# Patient Record
Sex: FEMALE | Race: WHITE | NOT HISPANIC OR LATINO | URBAN - METROPOLITAN AREA
[De-identification: names, ages, dates, MRNs, and addresses within clinical notes are randomized per-mention and may not be internally consistent; named-entity substitution may affect disease eponyms.]

---

## 2017-02-28 ENCOUNTER — INPATIENT (INPATIENT)
Facility: HOSPITAL | Age: 62
LOS: 7 days | Discharge: NURSING, REHAB OR LONG-TERM CARE | End: 2017-03-08
Attending: INTERNAL MEDICINE | Admitting: INTERNAL MEDICINE
Payer: COMMERCIAL

## 2017-02-28 VITALS
WEIGHT: 184.97 LBS | DIASTOLIC BLOOD PRESSURE: 86 MMHG | HEART RATE: 119 BPM | TEMPERATURE: 98 F | OXYGEN SATURATION: 99 % | RESPIRATION RATE: 19 BRPM | HEIGHT: 70 IN | SYSTOLIC BLOOD PRESSURE: 126 MMHG

## 2017-02-28 LAB
ALBUMIN SERPL ELPH-MCNC: 2.3 G/DL — LOW (ref 3.3–5)
ALP SERPL-CCNC: 197 U/L — HIGH (ref 40–120)
ALT FLD-CCNC: 21 U/L — SIGNIFICANT CHANGE UP (ref 12–78)
ANION GAP SERPL CALC-SCNC: 10 MMOL/L — SIGNIFICANT CHANGE UP (ref 5–17)
APTT BLD: 31 SEC — SIGNIFICANT CHANGE UP (ref 27.5–37.4)
AST SERPL-CCNC: 37 U/L — SIGNIFICANT CHANGE UP (ref 15–37)
BASOPHILS # BLD AUTO: 0.1 K/UL — SIGNIFICANT CHANGE UP (ref 0–0.2)
BASOPHILS NFR BLD AUTO: 0.9 % — SIGNIFICANT CHANGE UP (ref 0–2)
BILIRUB SERPL-MCNC: 0.6 MG/DL — SIGNIFICANT CHANGE UP (ref 0.2–1.2)
BUN SERPL-MCNC: 17 MG/DL — SIGNIFICANT CHANGE UP (ref 7–23)
CALCIUM SERPL-MCNC: 7.4 MG/DL — LOW (ref 8.5–10.1)
CHLORIDE SERPL-SCNC: 103 MMOL/L — SIGNIFICANT CHANGE UP (ref 96–108)
CK MB BLD-MCNC: 2.2 % — SIGNIFICANT CHANGE UP (ref 0–3.5)
CK MB CFR SERPL CALC: 0.5 NG/ML — SIGNIFICANT CHANGE UP (ref 0.5–3.6)
CK SERPL-CCNC: 23 U/L — LOW (ref 26–192)
CO2 SERPL-SCNC: 27 MMOL/L — SIGNIFICANT CHANGE UP (ref 22–31)
CREAT SERPL-MCNC: 0.62 MG/DL — SIGNIFICANT CHANGE UP (ref 0.5–1.3)
EOSINOPHIL # BLD AUTO: 0.1 K/UL — SIGNIFICANT CHANGE UP (ref 0–0.5)
EOSINOPHIL NFR BLD AUTO: 0.5 % — SIGNIFICANT CHANGE UP (ref 0–6)
GLUCOSE SERPL-MCNC: 127 MG/DL — HIGH (ref 70–99)
HCT VFR BLD CALC: 40.6 % — SIGNIFICANT CHANGE UP (ref 34.5–45)
HGB BLD-MCNC: 13.4 G/DL — SIGNIFICANT CHANGE UP (ref 11.5–15.5)
INR BLD: 1.32 RATIO — HIGH (ref 0.88–1.16)
LYMPHOCYTES # BLD AUTO: 1.4 K/UL — SIGNIFICANT CHANGE UP (ref 1–3.3)
LYMPHOCYTES # BLD AUTO: 10.3 % — LOW (ref 13–44)
MCHC RBC-ENTMCNC: 31.6 PG — SIGNIFICANT CHANGE UP (ref 27–34)
MCHC RBC-ENTMCNC: 33.1 GM/DL — SIGNIFICANT CHANGE UP (ref 32–36)
MCV RBC AUTO: 95.5 FL — SIGNIFICANT CHANGE UP (ref 80–100)
MONOCYTES # BLD AUTO: 0.7 K/UL — SIGNIFICANT CHANGE UP (ref 0–0.9)
MONOCYTES NFR BLD AUTO: 5.6 % — SIGNIFICANT CHANGE UP (ref 2–14)
NEUTROPHILS # BLD AUTO: 11 K/UL — HIGH (ref 1.8–7.4)
NEUTROPHILS NFR BLD AUTO: 82.8 % — HIGH (ref 43–77)
PLATELET # BLD AUTO: 309 K/UL — SIGNIFICANT CHANGE UP (ref 150–400)
POTASSIUM SERPL-MCNC: 3 MMOL/L — LOW (ref 3.5–5.3)
POTASSIUM SERPL-SCNC: 3 MMOL/L — LOW (ref 3.5–5.3)
PROT SERPL-MCNC: 6.7 GM/DL — SIGNIFICANT CHANGE UP (ref 6–8.3)
PROTHROM AB SERPL-ACNC: 14.8 SEC — HIGH (ref 10–13.1)
RBC # BLD: 4.25 M/UL — SIGNIFICANT CHANGE UP (ref 3.8–5.2)
RBC # FLD: 13.5 % — SIGNIFICANT CHANGE UP (ref 11–15)
SODIUM SERPL-SCNC: 140 MMOL/L — SIGNIFICANT CHANGE UP (ref 135–145)
TROPONIN I SERPL-MCNC: <.015 NG/ML — SIGNIFICANT CHANGE UP (ref 0.01–0.04)
WBC # BLD: 13.2 K/UL — HIGH (ref 3.8–10.5)
WBC # FLD AUTO: 13.2 K/UL — HIGH (ref 3.8–10.5)

## 2017-02-28 PROCEDURE — 99285 EMERGENCY DEPT VISIT HI MDM: CPT

## 2017-02-28 PROCEDURE — 93971 EXTREMITY STUDY: CPT | Mod: 26

## 2017-02-28 PROCEDURE — 71010: CPT | Mod: 26

## 2017-02-28 RX ORDER — GABAPENTIN 400 MG/1
0 CAPSULE ORAL
Qty: 0 | Refills: 0 | COMMUNITY

## 2017-02-28 RX ORDER — HEPARIN SODIUM 5000 [USP'U]/ML
INJECTION INTRAVENOUS; SUBCUTANEOUS
Qty: 25000 | Refills: 0 | Status: DISCONTINUED | OUTPATIENT
Start: 2017-02-28 | End: 2017-03-01

## 2017-02-28 RX ORDER — HEPARIN SODIUM 5000 [USP'U]/ML
6500 INJECTION INTRAVENOUS; SUBCUTANEOUS EVERY 6 HOURS
Qty: 0 | Refills: 0 | Status: DISCONTINUED | OUTPATIENT
Start: 2017-02-28 | End: 2017-03-03

## 2017-02-28 RX ORDER — ONDANSETRON 8 MG/1
4 TABLET, FILM COATED ORAL ONCE
Qty: 0 | Refills: 0 | Status: COMPLETED | OUTPATIENT
Start: 2017-02-28 | End: 2017-02-28

## 2017-02-28 RX ORDER — PANTOPRAZOLE SODIUM 20 MG/1
40 TABLET, DELAYED RELEASE ORAL ONCE
Qty: 0 | Refills: 0 | Status: COMPLETED | OUTPATIENT
Start: 2017-02-28 | End: 2017-02-28

## 2017-02-28 RX ORDER — SODIUM CHLORIDE 9 MG/ML
3 INJECTION INTRAMUSCULAR; INTRAVENOUS; SUBCUTANEOUS ONCE
Qty: 0 | Refills: 0 | Status: COMPLETED | OUTPATIENT
Start: 2017-02-28 | End: 2017-02-28

## 2017-02-28 RX ORDER — AZITHROMYCIN 500 MG/1
500 TABLET, FILM COATED ORAL ONCE
Qty: 0 | Refills: 0 | Status: COMPLETED | OUTPATIENT
Start: 2017-02-28 | End: 2017-03-01

## 2017-02-28 RX ORDER — CEFTRIAXONE 500 MG/1
1 INJECTION, POWDER, FOR SOLUTION INTRAMUSCULAR; INTRAVENOUS ONCE
Qty: 0 | Refills: 0 | Status: COMPLETED | OUTPATIENT
Start: 2017-02-28 | End: 2017-03-01

## 2017-02-28 RX ORDER — HEPARIN SODIUM 5000 [USP'U]/ML
3000 INJECTION INTRAVENOUS; SUBCUTANEOUS EVERY 6 HOURS
Qty: 0 | Refills: 0 | Status: DISCONTINUED | OUTPATIENT
Start: 2017-02-28 | End: 2017-03-03

## 2017-02-28 RX ORDER — HEPARIN SODIUM 5000 [USP'U]/ML
6500 INJECTION INTRAVENOUS; SUBCUTANEOUS ONCE
Qty: 0 | Refills: 0 | Status: COMPLETED | OUTPATIENT
Start: 2017-02-28 | End: 2017-03-01

## 2017-02-28 RX ADMIN — ONDANSETRON 4 MILLIGRAM(S): 8 TABLET, FILM COATED ORAL at 22:59

## 2017-02-28 RX ADMIN — SODIUM CHLORIDE 3 MILLILITER(S): 9 INJECTION INTRAMUSCULAR; INTRAVENOUS; SUBCUTANEOUS at 22:28

## 2017-02-28 RX ADMIN — PANTOPRAZOLE SODIUM 40 MILLIGRAM(S): 20 TABLET, DELAYED RELEASE ORAL at 22:59

## 2017-02-28 NOTE — ED ADULT TRIAGE NOTE - CHIEF COMPLAINT QUOTE
Patient BIBA for swollen Left leg. Patient states : "leg swollen really bad over the weekend." Denies injury or trauma. Denies pain "I have neuropathy so my legs are always numb."

## 2017-02-28 NOTE — ED ADULT NURSE NOTE - OBJECTIVE STATEMENT
Pt states her left leg began to swell starting about 1 week ago.  Has had swelling in both feet for some time.  Has hx of severe neuropathy in both hands and feet and is unable to ambulate.  Has lesion on spine.

## 2017-02-28 NOTE — ED PROVIDER NOTE - OBJECTIVE STATEMENT
61yoF; with pmh signif for Gastric Cancer (s/p partial gastrectomy), HTN, Spinal Lesion causing LE weakness (non-ambulatory); now p/w left leg swelling and pain x1 week. c/o cough x1 week. denies f/c/s. denies chest pain. denies sob/palp. denies abd pain. denies n/v/d. patient is former smoker, not taking any hormonal therapy, denies recent surgery or trauma.

## 2017-02-28 NOTE — ED PROVIDER NOTE - PHYSICAL EXAMINATION
General:     NAD, well-nourished, well-appearing  Head:     NC/AT, EOMI, oral mucosa moist  Neck:     trachea midline  Lungs:     CTA b/l, no w/r/r  CVS:     S1S2, RRR, no m/g/r  Abd:     +BS, s/nt/nd, no organomegaly  Ext:   intact bilateral radial and pedal pulses, 3+ left LE edema to thigh

## 2017-03-01 DIAGNOSIS — E27.9 DISORDER OF ADRENAL GLAND, UNSPECIFIED: ICD-10-CM

## 2017-03-01 DIAGNOSIS — I82.402 ACUTE EMBOLISM AND THROMBOSIS OF UNSPECIFIED DEEP VEINS OF LEFT LOWER EXTREMITY: ICD-10-CM

## 2017-03-01 DIAGNOSIS — C16.9 MALIGNANT NEOPLASM OF STOMACH, UNSPECIFIED: ICD-10-CM

## 2017-03-01 DIAGNOSIS — Z29.9 ENCOUNTER FOR PROPHYLACTIC MEASURES, UNSPECIFIED: ICD-10-CM

## 2017-03-01 DIAGNOSIS — R93.8 ABNORMAL FINDINGS ON DIAGNOSTIC IMAGING OF OTHER SPECIFIED BODY STRUCTURES: ICD-10-CM

## 2017-03-01 DIAGNOSIS — I10 ESSENTIAL (PRIMARY) HYPERTENSION: ICD-10-CM

## 2017-03-01 DIAGNOSIS — I80.10 PHLEBITIS AND THROMBOPHLEBITIS OF UNSPECIFIED FEMORAL VEIN: ICD-10-CM

## 2017-03-01 DIAGNOSIS — C73 MALIGNANT NEOPLASM OF THYROID GLAND: ICD-10-CM

## 2017-03-01 DIAGNOSIS — R91.8 OTHER NONSPECIFIC ABNORMAL FINDING OF LUNG FIELD: ICD-10-CM

## 2017-03-01 DIAGNOSIS — I26.99 OTHER PULMONARY EMBOLISM WITHOUT ACUTE COR PULMONALE: ICD-10-CM

## 2017-03-01 LAB
ANION GAP SERPL CALC-SCNC: 11 MMOL/L — SIGNIFICANT CHANGE UP (ref 5–17)
APTT BLD: 79.5 SEC — HIGH (ref 27.5–37.4)
APTT BLD: 88.5 SEC — HIGH (ref 27.5–37.4)
BUN SERPL-MCNC: 16 MG/DL — SIGNIFICANT CHANGE UP (ref 7–23)
CALCIUM SERPL-MCNC: 7.1 MG/DL — LOW (ref 8.5–10.1)
CHLORIDE SERPL-SCNC: 103 MMOL/L — SIGNIFICANT CHANGE UP (ref 96–108)
CO2 SERPL-SCNC: 25 MMOL/L — SIGNIFICANT CHANGE UP (ref 22–31)
CREAT SERPL-MCNC: 0.54 MG/DL — SIGNIFICANT CHANGE UP (ref 0.5–1.3)
GLUCOSE SERPL-MCNC: 99 MG/DL — SIGNIFICANT CHANGE UP (ref 70–99)
HCT VFR BLD CALC: 34.5 % — SIGNIFICANT CHANGE UP (ref 34.5–45)
HCV AB S/CO SERPL IA: 0.36 S/CO — SIGNIFICANT CHANGE UP
HCV AB SERPL-IMP: SIGNIFICANT CHANGE UP
HGB BLD-MCNC: 11.3 G/DL — LOW (ref 11.5–15.5)
MCHC RBC-ENTMCNC: 31.7 PG — SIGNIFICANT CHANGE UP (ref 27–34)
MCHC RBC-ENTMCNC: 32.8 GM/DL — SIGNIFICANT CHANGE UP (ref 32–36)
MCV RBC AUTO: 96.8 FL — SIGNIFICANT CHANGE UP (ref 80–100)
PLATELET # BLD AUTO: 257 K/UL — SIGNIFICANT CHANGE UP (ref 150–400)
POTASSIUM SERPL-MCNC: 3.5 MMOL/L — SIGNIFICANT CHANGE UP (ref 3.5–5.3)
POTASSIUM SERPL-SCNC: 3.5 MMOL/L — SIGNIFICANT CHANGE UP (ref 3.5–5.3)
RBC # BLD: 3.56 M/UL — LOW (ref 3.8–5.2)
RBC # FLD: 13.6 % — SIGNIFICANT CHANGE UP (ref 11–15)
SODIUM SERPL-SCNC: 139 MMOL/L — SIGNIFICANT CHANGE UP (ref 135–145)
T3 SERPL-MCNC: 65 NG/DL — LOW (ref 80–200)
T4 AB SER-ACNC: 5.5 UG/DL — SIGNIFICANT CHANGE UP (ref 4.6–12)
TSH SERPL-MCNC: 4.49 UIU/ML — HIGH (ref 0.36–3.74)
WBC # BLD: 12.4 K/UL — HIGH (ref 3.8–10.5)
WBC # FLD AUTO: 12.4 K/UL — HIGH (ref 3.8–10.5)

## 2017-03-01 PROCEDURE — 99223 1ST HOSP IP/OBS HIGH 75: CPT

## 2017-03-01 PROCEDURE — 71275 CT ANGIOGRAPHY CHEST: CPT | Mod: 26

## 2017-03-01 RX ORDER — SODIUM CHLORIDE 9 MG/ML
1000 INJECTION, SOLUTION INTRAVENOUS
Qty: 0 | Refills: 0 | Status: DISCONTINUED | OUTPATIENT
Start: 2017-03-01 | End: 2017-03-04

## 2017-03-01 RX ORDER — GABAPENTIN 400 MG/1
600 CAPSULE ORAL THREE TIMES A DAY
Qty: 0 | Refills: 0 | Status: DISCONTINUED | OUTPATIENT
Start: 2017-03-01 | End: 2017-03-03

## 2017-03-01 RX ORDER — HEPARIN SODIUM 5000 [USP'U]/ML
INJECTION INTRAVENOUS; SUBCUTANEOUS
Qty: 25000 | Refills: 0 | Status: DISCONTINUED | OUTPATIENT
Start: 2017-03-01 | End: 2017-03-03

## 2017-03-01 RX ORDER — ACETAMINOPHEN 500 MG
650 TABLET ORAL EVERY 6 HOURS
Qty: 0 | Refills: 0 | Status: DISCONTINUED | OUTPATIENT
Start: 2017-03-01 | End: 2017-03-03

## 2017-03-01 RX ORDER — AMLODIPINE BESYLATE 2.5 MG/1
10 TABLET ORAL DAILY
Qty: 0 | Refills: 0 | Status: DISCONTINUED | OUTPATIENT
Start: 2017-03-01 | End: 2017-03-03

## 2017-03-01 RX ORDER — LEVOTHYROXINE SODIUM 125 MCG
200 TABLET ORAL DAILY
Qty: 0 | Refills: 0 | Status: DISCONTINUED | OUTPATIENT
Start: 2017-03-01 | End: 2017-03-03

## 2017-03-01 RX ORDER — SUCRALFATE 1 G
1 TABLET ORAL THREE TIMES A DAY
Qty: 0 | Refills: 0 | Status: DISCONTINUED | OUTPATIENT
Start: 2017-03-01 | End: 2017-03-03

## 2017-03-01 RX ORDER — INFLUENZA VIRUS VACCINE 15; 15; 15; 15 UG/.5ML; UG/.5ML; UG/.5ML; UG/.5ML
0.5 SUSPENSION INTRAMUSCULAR ONCE
Qty: 0 | Refills: 0 | Status: DISCONTINUED | OUTPATIENT
Start: 2017-03-01 | End: 2017-03-08

## 2017-03-01 RX ORDER — POTASSIUM CHLORIDE 20 MEQ
40 PACKET (EA) ORAL ONCE
Qty: 0 | Refills: 0 | Status: COMPLETED | OUTPATIENT
Start: 2017-03-01 | End: 2017-03-01

## 2017-03-01 RX ADMIN — AZITHROMYCIN 255 MILLIGRAM(S): 500 TABLET, FILM COATED ORAL at 00:20

## 2017-03-01 RX ADMIN — Medication 200 MILLIGRAM(S): at 21:41

## 2017-03-01 RX ADMIN — Medication 200 MICROGRAM(S): at 06:47

## 2017-03-01 RX ADMIN — GABAPENTIN 600 MILLIGRAM(S): 400 CAPSULE ORAL at 06:48

## 2017-03-01 RX ADMIN — AMLODIPINE BESYLATE 10 MILLIGRAM(S): 2.5 TABLET ORAL at 06:48

## 2017-03-01 RX ADMIN — Medication 40 MILLIEQUIVALENT(S): at 02:20

## 2017-03-01 RX ADMIN — SODIUM CHLORIDE 80 MILLILITER(S): 9 INJECTION, SOLUTION INTRAVENOUS at 15:47

## 2017-03-01 RX ADMIN — HEPARIN SODIUM 1500 UNIT(S)/HR: 5000 INJECTION INTRAVENOUS; SUBCUTANEOUS at 09:48

## 2017-03-01 RX ADMIN — Medication 650 MILLIGRAM(S): at 02:21

## 2017-03-01 RX ADMIN — Medication 1 GRAM(S): at 13:53

## 2017-03-01 RX ADMIN — Medication 0.5 MILLIGRAM(S): at 13:53

## 2017-03-01 RX ADMIN — GABAPENTIN 600 MILLIGRAM(S): 400 CAPSULE ORAL at 22:28

## 2017-03-01 RX ADMIN — Medication 1 GRAM(S): at 22:29

## 2017-03-01 RX ADMIN — HEPARIN SODIUM 1500 UNIT(S)/HR: 5000 INJECTION INTRAVENOUS; SUBCUTANEOUS at 00:27

## 2017-03-01 RX ADMIN — HEPARIN SODIUM 1500 UNIT(S)/HR: 5000 INJECTION INTRAVENOUS; SUBCUTANEOUS at 17:18

## 2017-03-01 RX ADMIN — Medication 1 GRAM(S): at 06:48

## 2017-03-01 RX ADMIN — CEFTRIAXONE 100 GRAM(S): 500 INJECTION, POWDER, FOR SOLUTION INTRAMUSCULAR; INTRAVENOUS at 02:20

## 2017-03-01 RX ADMIN — GABAPENTIN 600 MILLIGRAM(S): 400 CAPSULE ORAL at 13:52

## 2017-03-01 RX ADMIN — Medication 0.5 MILLIGRAM(S): at 22:28

## 2017-03-01 RX ADMIN — Medication 0.5 MILLIGRAM(S): at 06:47

## 2017-03-01 RX ADMIN — HEPARIN SODIUM 5000 UNIT(S): 5000 INJECTION INTRAVENOUS; SUBCUTANEOUS at 00:21

## 2017-03-01 NOTE — H&P ADULT. - PROBLEM SELECTOR PLAN 5
Post-surgical hypothyroidism?  Continue Synthroid home dose 200 mcg daily  Confirm history with PMD  Will send TFTs

## 2017-03-01 NOTE — PHYSICAL THERAPY INITIAL EVALUATION ADULT - TINETTI BALANCE TEST, REHAB EVAL
Sitting Balance -1   Rises From Chair -0  Attempts to rise -0   Immediate Standing Balance -0   Standing Balance -0   Nudged -0   Eyes Closed -0   Turning 360 Deg - 0  Sitting down -0   Balance Score - 0/16

## 2017-03-01 NOTE — PROGRESS NOTE ADULT - SUBJECTIVE AND OBJECTIVE BOX
61 year old woman with PMH HTN, history of stomach and thyroid cancer (neither active as per family) spinal lesion (unclear etiology, followed q 1 month with MRI as per family) and paraplegia presents with complaint of b/l leg swelling for unknown amount of time.  Family states that they noticed the patient's feet were swollen for the last few weeks and advised her to see her doctor but the patient had trouble leaving her home due to her paralysis.  She also complains of cough with pleuritic chest pain x 1 week.  She denies fever, chills, wheezing, SOB.      LE US positive for b/l DVT.      CT Scan showed two lesions in right lung - oncology consult called for this      PAST MEDICAL & SURGICAL HISTORY:  GERD (gastroesophageal reflux disease)  Thyroid cancer  Stomach cancer  HTN (hypertension)  No significant past surgical history      REVIEW OF SYSTEMS:    weakness in legs      MEDICATIONS  (STANDING):  heparin  Infusion. Unit(s)/Hr IV Continuous <Continuous>  gabapentin 600milliGRAM(s) Oral three times a day  LORazepam     Tablet 0.5milliGRAM(s) Oral three times a day  amLODIPine   Tablet 10milliGRAM(s) Oral daily  hydrochlorothiazide   Tablet 25milliGRAM(s) Oral daily  sucralfate suspension 1Gram(s) Oral three times a day  levothyroxine 200MICROGram(s) Oral daily  sodium chloride 0.45%. 1000milliLiter(s) IV Continuous <Continuous>  influenza   Vaccine 0.5milliLiter(s) IntraMuscular once    MEDICATIONS  (PRN):  heparin  Injectable 6500Unit(s) IV Push every 6 hours PRN For aPTT less than 40  heparin  Injectable 3000Unit(s) IV Push every 6 hours PRN For aPTT between 40 - 57  oxyCODONE  5 mG/acetaminophen 325 mG 1Tablet(s) Oral every 6 hours PRN Severe Pain (7 - 10)  acetaminophen   Tablet 650milliGRAM(s) Oral every 6 hours PRN For Temp greater than 38 C (100.4 F)      Allergies    No Known Allergies    Intolerances        SOCIAL HISTORY:    Smoking Status: past smoker   Alcohol: social  Marital Status: 2 daughters  Occupation: retired    FAMILY HISTORY:  No pertinent family history in first degree relatives    	    Vital Signs Last 24 Hrs  T(C): 37.1, Max: 37.2 (03-01 @ 03:42)  T(F): 98.8, Max: 99 (03-01 @ 03:42)  HR: 105 (95 - 119)  BP: 108/64 (97/57 - 126/86)  BP(mean): --  RR: 17 (17 - 20)  SpO2: 96% (96% - 100%)    PHYSICAL EXAM:    general - AAO x 3  HEENT - No Icterus  CVS - RRR  RS - AE B/L  Abd - soft, NT  Ext - Pulses +        LABS:                        11.3   12.4  )-----------( 257      ( 01 Mar 2017 07:33 )             34.5     01 Mar 2017 07:33    139    |  103    |  16     ----------------------------<  99     3.5     |  25     |  0.54     Ca    7.1        01 Mar 2017 07:33    TPro  6.7    /  Alb  2.3    /  TBili  0.6    /  DBili  x      /  AST  37     /  ALT  21     /  AlkPhos  197    28 Feb 2017 22:37    PT/INR - ( 28 Feb 2017 22:37 )   PT: 14.8 sec;   INR: 1.32 ratio         PTT - ( 01 Mar 2017 07:33 )  PTT:88.5 sec      RADIOLOGY & ADDITIONAL STUDIES:

## 2017-03-01 NOTE — OCCUPATIONAL THERAPY INITIAL EVALUATION ADULT - NS ASR OT EQUIP NEEDS DISCH
wheelchair/overhead frame and trapeze/Hospital bed, gavin lift, Ramps, wheelchair, Built up handles for gripping

## 2017-03-01 NOTE — CHART NOTE - NSCHARTNOTEFT_GEN_A_CORE
CTA showed no PE, however incidental findings as follows.  -Soft tissue mass concerning for neoplasm, heme/onc and pulmonary consulted  -Adrenal nodule, will send initial workup and obtain endocrinology consult

## 2017-03-01 NOTE — H&P ADULT. - NEGATIVE GASTROINTESTINAL SYMPTOMS
no abdominal pain/no hematochezia/no change in bowel habits/no melena/no nausea/no vomiting/no constipation/no diarrhea

## 2017-03-01 NOTE — CHART NOTE - NSCHARTNOTEFT_GEN_A_CORE
Pt. seen/examined at bed side  h and P, labs, imaging reviewed  care d/w consultants  will continue current Rx, and f/u consults  will also get CT surg/IR consult for biopsy  d/w daughter Shobha at length,  Pt. with h/o gastric ca x 5 yr back s/p partial gastrectomy and chemo  Also with h/o thyroid CA x 17 yr back /p thyroidectomy, RT and chemo  Having difficulty to walk due to LE weakness x almost 1 year but worsened over last 6 months , mostly bed bound for almost 6 months, follows with Neuro as outpatient and gets frequent MRI to f/u on spine lesion which resulted in LE weakness.  Daughter interested in biopsy.

## 2017-03-01 NOTE — H&P ADULT. - PROBLEM SELECTOR PLAN 2
Wedge-shaped infiltrate seen on CXR in ED  Will get CTA to rule out r/o Pulmonary Embolism given tachycardia and cough with chest pain.  Possibly CAP given leukocytosis  Once dose of abx given in ED  If CTA neg for pulm emb will continue abx and treat for CAP

## 2017-03-01 NOTE — PHYSICAL THERAPY INITIAL EVALUATION ADULT - PERTINENT HX OF CURRENT PROBLEM, REHAB EVAL
Pt admitted to [facility] secondary to c/o L LE swelling and pain, LE US on 2/28 Occlusive deep vein thrombus is present within the greater saphenous, common femoral, femoral, popliteal, peroneal, and posterior tibial veins. CT chest angio on 3/1 revealed pleural effusion in R middle lobe, MRI needed for further evaluation.

## 2017-03-01 NOTE — OCCUPATIONAL THERAPY INITIAL EVALUATION ADULT - ADDITIONAL COMMENTS
Brenda lives in a private house with 4-5 steps to enter with no railings. Once inside, the patient's main bedroom and bathroom is on the main floor. The patient does not use the main bathroom due to limited to no use of BLE for ambulation and transfers. The patient does sponge bathing and performs toileting on the bedside commode with assistance. The patient has not been ambulating for the past 6 months to a year due to spinal lesions and neuropathy of the legs. The patient performs a stand pivot transfer with a rolling walker and with assistance of 2 from the family. The patient is able to state wants and needs.

## 2017-03-01 NOTE — CONSULT NOTE ADULT - SUBJECTIVE AND OBJECTIVE BOX
Patient is a 61y old  Female who presents with a chief complaint of Leg swelling (01 Mar 2017 00:23)      HPI:  61 year old woman with PMH HTN, history of stomach CA s/p subtotal gastrectomy 5 years ago, and thyroid cancer s/p surgery 17 years ago (neither active as per family)Cervical spinal lesion (unclear etiology, followed q 1 month with MRI as per family) and bilateral LE weakness with neuropathy, with limited mobility. Presented with complaint of b/l leg swelling for unknown amount of time.  Family states that they noticed the patient's feet were swollen for the last few weeks and advised her to see her doctor but the patient had trouble leaving her home due to her LE weakness.  She also complains of cough with pleuritic chest pain x 1 week.  She denies fever, chills, wheezing, SOB.    Found to have extensive right leg DVT and right lung mass.    PAST MEDICAL & SURGICAL HISTORY:  GERD (gastroesophageal reflux disease)  Thyroid cancer  Stomach cancer  HTN (hypertension)  No significant past surgical history    FAMILY HISTORY:  No pertinent family history in first degree relatives    SOCIAL HISTORY: BMI (kg/m2): 26.4 . Smoked close to 25 years, quit 23 years ago.    Allergies  No Known Allergies    MEDICATIONS  (STANDING):  gabapentin 600milliGRAM(s) Oral three times a day  LORazepam     Tablet 0.5milliGRAM(s) Oral three times a day  amLODIPine   Tablet 10milliGRAM(s) Oral daily  hydrochlorothiazide   Tablet 25milliGRAM(s) Oral daily  sucralfate suspension 1Gram(s) Oral three times a day  levothyroxine 200MICROGram(s) Oral daily  sodium chloride 0.45%. 1000milliLiter(s) IV Continuous <Continuous>  influenza   Vaccine 0.5milliLiter(s) IntraMuscular once  heparin  Infusion. Unit(s)/Hr IV Continuous <Continuous>    MEDICATIONS  (PRN):  heparin  Injectable 6500Unit(s) IV Push every 6 hours PRN For aPTT less than 40  heparin  Injectable 3000Unit(s) IV Push every 6 hours PRN For aPTT between 40 - 57  oxyCODONE  5 mG/acetaminophen 325 mG 1Tablet(s) Oral every 6 hours PRN Severe Pain (7 - 10)  acetaminophen   Tablet 650milliGRAM(s) Oral every 6 hours PRN For Temp greater than 38 C (100.4 F)  guaiFENesin    Syrup 200milliGRAM(s) Oral every 6 hours PRN Cough    Vital Signs Last 24 Hrs  T(C): 37.1, Max: 37.2 (03-01 @ 03:42)  T(F): 98.8, Max: 99 (03-01 @ 03:42)  HR: 107 (95 - 119)  BP: 127/73 (97/57 - 127/73)  BP(mean): --  RR: 17 (17 - 20)  SpO2: 95% (95% - 100%)    PHYSICAL EXAM:  GEN:         Awake, responsive and comfortable.  HEENT:    Normal.    RESP:       no wheezing.  CVS:          Regular rate and rhythm.   ABD:         Soft, non-tender, non-distended;   :            No costovertebral angle tenderness  SKIN:          Warm and dry.  EXTR:          left leg edema  PSYCH:        cooperative, no anxiety or depression    LABS:                        11.3   12.4  )-----------( 257      ( 01 Mar 2017 07:33 )             34.5     01 Mar 2017 07:33    139    |  103    |  16     ----------------------------<  99     3.5     |  25     |  0.54     Ca    7.1        01 Mar 2017 07:33    TPro  6.7    /  Alb  2.3    /  TBili  0.6    /  DBili  x      /  AST  37     /  ALT  21     /  AlkPhos  197    28 Feb 2017 22:37    PT/INR - ( 28 Feb 2017 22:37 )   PT: 14.8 sec;   INR: 1.32 ratio         PTT - ( 01 Mar 2017 15:17 )  PTT:79.5 sec    EKG: junctional rhythm    RADIOLOGY & ADDITIONAL STUDIES:  EXAM:  CT ANGIO CHEST (W)AW IC                            PROCEDURE DATE:  03/01/2017        INTERPRETATION:  CLINICAL INDICATION:  Shortness of breath    TECHNIQUE:    Spiral axial tomographic images were performed from the apex of the lungs   to the domes of the diaphragm during the dynamic injection of intravenous   contrast, as per pulmonary embolism protocol. MIP images were also   obtained. 75 cc of Omnipaque 350 administered and 25 cc discarded.      FINDINGS:    The thyroid gland is unremarkable. There are no mediastinal lymph nodes   which meet CT criteria for enlargement. There is no significant cardiac   chamber enlargement. The aorta is normal in size. The main pulmonary   artery is mildly dilated, measuring up to 3.3 cm suggesting pulmonary   arterial hypertension. Fluid noted within the mid and distal esophagus,   correlate for aspiration risk. There are coronary artery calcifications.   There is no pericardial effusion.    The visualized portions of the upper abdomen demonstrates diffuse hepatic   steatosis. Surgical sutures noted along the stomach. Indeterminate 2.5 x   1.9 cm left adrenal nodule.    There is a small right-sided pleural effusion. There is abnormal soft   tissue in the right hilum measuring 3.3 x 3.1 cm with associated   narrowing of the segmental and subsegmental branches of the right   pulmonary artery branches. No left-sided pulmonary embolus. There is a   4.8 x 4.7 cm area of cavitation in the right lower lobe.      There is multilevel thoracic spondylosis.        IMPRESSION:      Mild upper lobe predominant centrilobular emphysema. Pleural-parenchymal   scarring at the right lung apex. Abnormal soft tissue in the right hilum   measuring 3.3 x 3.1 cm with associated narrowing of the segmental and   subsegmental branches of the right pulmonary artery branches. No   left-sided pulmonary embolus. There is a 4.8 x 4.7 cm area of cavitation   in the right lower lobe. Findings suspicious for neoplasm. PET/CT   suggested for further evaluation.    Small right-sided pleural effusion. Obstruction of the right middle lobe   bronchus with associated partial atelectasis of the right middle lobe.    Indeterminate 2.5 x 1.9 cm left adrenal nodule. MRI can be obtained for   further evaluation.    Fluid noted within the mid and distal esophagus, correlate for aspiration   risk.    Diffuse hepatic steatosis.        The findings were discussed with DR JENSEN on 3/1/2017 2:05 AM.  Hospital policies for call back including read back policies were   followed. The verbal communication call back supplements this written   report.    DERICK DWYER M.D., ATTENDING RADIOLOGIST  This document has been electronically signed. Mar  1 2017  2:12AM        EXAM:  US DPLX LWR EXT VEINS LTD LT                            PROCEDURE DATE:  02/28/2017        INTERPRETATION:  History: Left leg swelling.    Technique: Grayscale and color Doppler images of the left lower extremity   were obtained.    No priors.    Findings:  Occlusive deep vein thrombus is present within the greater saphenous,   common femoral, femoral, popliteal, peroneal, and posterior tibial veins.    Impression:  Occlusive deep vein thrombus is present within the greater saphenous,   common femoral, femoral, popliteal, peroneal, and posterior tibial veins.    Findings discussed with Dr. Jensen of emergency room at the time dictation.    YULIET AHMADI M.D., ATTENDING RADIOLOGIST  This document has been electronically signed. Feb 28 2017 10:55PM      ASSESSMENT AND PLAN:  ·	Right lung mass.  ·	Left leg greater saphenous popliteal, femoral))  ·	Pleural effusion.  ·	Gastric CA history.  ·	Thyroid CA history.  ·	HTN.  ·	Anemia.    Case discussed with CT surgery, Oncology and pt family. Will prefer IVC filter placement before current planned biopsy or anticoagulate for3-4 weeks then arrange for biopsy.  Family agrees for IVC filter. DR. CARDOSO called. for IVC filter.

## 2017-03-01 NOTE — OCCUPATIONAL THERAPY INITIAL EVALUATION ADULT - MODIFIED CLINICAL TEST OF SENSORY INTEGRATION IN BALANCE TEST
Barthel Index: Feeding Score___10___, Bathing Score___0___, Grooming Score__5___, Dressing Score___0__, Bowel Score___5__, Bladder Score___5___, Toilet Score__0___, Transfer Score__0____, Mobility Score___0__, Stairs Score___0__, Total Score___25__.

## 2017-03-01 NOTE — H&P ADULT. - NEUROLOGICAL DETAILS
responds to pain/no spontaneous movement/normal strength/responds to verbal commands/alert and oriented x 3

## 2017-03-01 NOTE — H&P ADULT. - RADIOLOGY RESULTS AND INTERPRETATION
DVT + LE, will get CTA chest as CXR shows wedge-shaped opacity on the right consistent with patient's pleuritic chest pain.

## 2017-03-01 NOTE — H&P ADULT. - PROBLEM SELECTOR PLAN 1
Start Heparin drip for anticoagulation, monitor PTT q6hrs  Start IV Fluids 1/2 NS@80ml/hr for 24 hrs, then re-evaluate.  Tylenol 650mg po q6hrs PRN for fever/pain  Discuss AC as outpatient (family is opting for coumadin).  As there is no active malignancy likely a provoked DVT from lack of mobility.  Discussed duration of anticoagulation with family.

## 2017-03-01 NOTE — PROGRESS NOTE ADULT - PROBLEM SELECTOR PLAN 1
- two lung lesions seen on Ct Scan  - patients family intreasted in Lung Biopsy to ascertain cause  - consider pulmonary consult  - consider neurology consult for weakness in legs  - outpatient PET/CT Scan

## 2017-03-01 NOTE — CONSULT NOTE ADULT - SUBJECTIVE AND OBJECTIVE BOX
Patient is a 61y old  Female who presents with a chief complaint of Leg swelling (01 Mar 2017 00:23)      Reason For Consult: Adrenal mass     HPI:  61 year old woman with PMH HTN, history of stomach and thyroid cancer (neither active as per family) spinal lesion (unclear etiology, followed q 1 month with MRI as per family) and paraplegia presents with complaint of b/l leg swelling for unknown amount of time.  Family states that they noticed the patient's feet were swollen for the last few weeks and advised her to see her doctor but the patient had trouble leaving her home due to her paralysis.  She also complains of cough with pleuritic chest pain x 1 week.  She denies fever, chills, wheezing, SOB.    Endocrine History : history of thyroid Ca treated with surgery and  ? radiation . Also history of gastric Ca. Lung hilar mass is being worked up. Adrenal mass : 2.5 x 1.9 cms. density?. No symptoms and sign of Pheo, Cushing's or accelerated hypertension             LE US positive for b/l DVT. (01 Mar 2017 00:23)      PAST MEDICAL & SURGICAL HISTORY:  GERD (gastroesophageal reflux disease)  Thyroid cancer  Stomach cancer  HTN (hypertension)  No significant past surgical history      FAMILY HISTORY:  No pertinent family history in first degree relatives        Social History:    MEDICATIONS  (STANDING):  gabapentin 600milliGRAM(s) Oral three times a day  LORazepam     Tablet 0.5milliGRAM(s) Oral three times a day  amLODIPine   Tablet 10milliGRAM(s) Oral daily  hydrochlorothiazide   Tablet 25milliGRAM(s) Oral daily  sucralfate suspension 1Gram(s) Oral three times a day  levothyroxine 200MICROGram(s) Oral daily  sodium chloride 0.45%. 1000milliLiter(s) IV Continuous <Continuous>  influenza   Vaccine 0.5milliLiter(s) IntraMuscular once  heparin  Infusion. Unit(s)/Hr IV Continuous <Continuous>    MEDICATIONS  (PRN):  heparin  Injectable 6500Unit(s) IV Push every 6 hours PRN For aPTT less than 40  heparin  Injectable 3000Unit(s) IV Push every 6 hours PRN For aPTT between 40 - 57  oxyCODONE  5 mG/acetaminophen 325 mG 1Tablet(s) Oral every 6 hours PRN Severe Pain (7 - 10)  acetaminophen   Tablet 650milliGRAM(s) Oral every 6 hours PRN For Temp greater than 38 C (100.4 F)  guaiFENesin    Syrup 200milliGRAM(s) Oral every 6 hours PRN Cough      REVIEW OF SYSTEMS:  CONSTITUTIONAL:  as per HPI  HEENT:  Eyes:  No diplopia or blurred vision. ENT:  No earache, sore throat or runny nose.  CARDIOVASCULAR:  No pressure, squeezing, strangling, tightness, heaviness or aching about the chest, neck, axilla or epigastrium.  RESPIRATORY:  No cough, shortness of breath, PND or orthopnea.  GASTROINTESTINAL:  No nausea, vomiting or diarrhea.  GENITOURINARY:  No dysuria, frequency or urgency. No Blood in urine  MUSCULOSKELETAL:  no joint aches, no muscle pain, myalgia, increased lower extremity swelling   SKIN:  No change in skin, hair or nails.  NEUROLOGIC:  paraplegia  PSYCHIATRIC:  No disorder of thought or mood.  ENDOCRINE:  No heat or cold intolerance, polyuria or polydipsia. abnormal weight gain or loss, oral thrush  HEMATOLOGICAL:  No easy bruising or bleeding.     T(C): 37.1, Max: 37.2 (03-01 @ 03:42)  HR: 107 (95 - 118)  BP: 127/73 (97/57 - 127/73)  RR: 17 (17 - 20)  SpO2: 95% (95% - 100%)  Wt(kg): --    PHYSICAL EXAM:  GENERAL: NAD, well-groomed, well-developed  HEAD:  Atraumatic, Normocephalic  EYES: EOMI, PERRLA, conjunctiva and sclera clear  ENMT: No tonsillar erythema, exudates, or enlargement; Moist mucous membranes, Good dentition, No lesions  NECK: Supple, No JVD, Normal thyroid  NERVOUS SYSTEM:  Alert & Oriented X3, Good concentration; Motor Strength 5/5 B/L upper and lower extremities; DTRs 2+ intact and symmetric  CHEST/LUNG: Clear to percussion bilaterally; No rales, rhonchi, wheezing, or rubs  HEART: Regular rate and rhythm; No murmurs, rubs, or gallops  ABDOMEN: Soft, Nontender, Nondistended; Bowel sounds present  EXTREMITIES:  2+ Peripheral Pulses, No clubbing, cyanosis, or edema  LYMPH: No lymphadenopathy noted  SKIN: No rashes or lesions    CAPILLARY BLOOD GLUCOSE                            11.3   12.4  )-----------( 257      ( 01 Mar 2017 07:33 )             34.5       CMP:  03-01 @ 07:33  SGPT --  Albumin --   Alk Phos --   Anion Gap 11   SGOT --   Total Bili --   BUN 16   Calcium Total 7.1   CO2 25   Chloride 103   Creatinine 0.54   eGFR if    eGFR if non    Glucose 99   Potassium 3.5   Protein --   Sodium 139      Thyroid Function Tests:  03-01 @ 10:16 TSH -- FreeT4 -- T3 65 Anti TPO -- Anti Thyroglobulin Ab -- TSI --  03-01 @ 07:33 TSH 4.490 FreeT4 -- T3 -- Anti TPO -- Anti Thyroglobulin Ab -- TSI --      Diabetes Tests:     Parathyroids:     Adrenals:       Radiology:

## 2017-03-01 NOTE — OCCUPATIONAL THERAPY INITIAL EVALUATION ADULT - GENERAL OBSERVATIONS, REHAB EVAL
US duplex of 2/28/17 showed occlusive DVT LLE. Patient is currently on heprin drift for DVT of LLE. Pt was encountered supine in bed with family present; NAD, hx of cancer, IV on heprin drip, +3 edema LLE, chronic neuropathy of hands and legs which impairs patients sensation, AXOX4, cooperative, followed commands.

## 2017-03-01 NOTE — H&P ADULT. - PROBLEM SELECTOR PLAN 6
On heparin drip  General precautions  Continue home medications  Confirm medical history with PMD Dr. Guy [phone]  Will send Hep C as pt born 1955.

## 2017-03-01 NOTE — CONSULT NOTE ADULT - SUBJECTIVE AND OBJECTIVE BOX
PATIENT ADMITTED EMERGENTLY IN THE EMERGENCY ROOM  WITH LEG SWELLING. CONSULT CALLED FOR LUNG MASS    Chief Complaint/Reason for Admission:  Chief Complaint/Reason for Admission	Leg swelling	    History of Present Illness:  History of Present Illness		  61 year old woman with PMH HTN, history of stomach and thyroid cancer (neither active as per family) spinal lesion (unclear etiology, followed q 1 month with MRI as per family) and paraplegia presents with complaint of b/l leg swelling for unknown amount of time.  Family states that they noticed the patient's feet were swollen for the last few weeks and advised her to see her doctor but the patient had trouble leaving her home due to her paralysis.  She also complains of cough with pleuritic chest pain x 1 week.  She denies fever, chills, wheezing, SOB.      LE US positive for b/l DVT.      Allergies/Medications:   Allergies:        Allergies:  	No Known Allergies:     Home Medications:   * Incomplete Medication History as of 28-Feb-2017 22:05 documented in Prescription Writer  · 	Carafate 1 g/10 mL oral suspension: Last Dose Taken:  , 10 milliliter(s) orally 3 times  · 	Percocet 5/325 oral tablet: Last Dose Taken:  , 1 tab(s) orally every 6 hours  · 	gabapentin 600 mg oral tablet: Last Dose Taken:  , 1 tab(s) orally 3 times a day  · 	gabapentin 300 mg oral tablet: Last Dose Taken:  ,  orally 3 times a day  · 	Synthroid 200 mcg (0.2 mg) oral tablet: Last Dose Taken:  , 1 tab(s) orally once a day  · 	amLODIPine 10 mg oral tablet: Last Dose Taken:  , 1 tab(s) orally once a day  · 	indapamide 1.25 mg oral tablet: Last Dose Taken:  , 1 tab(s) orally once a day (in the morning)  · 	LORazepam 0.5 mg oral tablet: Last Dose Taken:  , 1 tab(s) orally 3 times a day    . .    PMH/PSH/FH/SH:   Past Medical History:  GERD (gastroesophageal reflux disease)    HTN (hypertension)    Stomach cancer    Thyroid cancer.    Past Surgical History:  No significant past surgical history.    Family History:  No pertinent family history in first degree relatives.    Social History:  · Marital Status	Single	    Tobacco Usage:  · Tobacco Usage: Former smoker	        Review of Systems:  · Negative General Symptoms	no fever; no chills; no sweating; no anorexia; no malaise; no fatigue	  · Negative Skin Symptoms	no rash; no itching; no dryness	  · Negative Ophthalmologic Symptoms	no blurred vision L; no blurred vision R; no loss of vision L; no loss of vision R	  · Negative Respiratory and Thorax Symptoms	no wheezing; no dyspnea; no hemoptysis	  · Respiratory and Thorax Symptoms	cough  pleuritic chest pain	  · Negative Cardiovascular Symptoms	no chest pain; no palpitations; no dyspnea on exertion; no orthopnea; no paroxysmal nocturnal dyspnea	  · Negative Gastrointestinal Symptoms	no nausea; no vomiting; no diarrhea; no constipation; no change in bowel habits; no abdominal pain; no melena; no hematochezia	  · Negative Musculoskeletal Symptoms	no arthralgia; no arthritis; no joint swelling	  · Negative Neurological Symptoms	no syncope; no vertigo; no loss of sensation; no headache; no loss of consciousness; no confusion	  · Negative Psychiatric Symptoms	no suicidal ideation; no depression; no anxiety; no mood swings; no agitation	  · Negative Hematology Symptoms	no gum bleeding; no nose bleeding; no skin lumps	  · Negative Lymphatic Symptoms	no enlarged lymph nodes; no tender lymph nodes	  · Lymphatic Symptoms	swelling of extremity	  · Swelling of Extremity	lower L; lower R	    Physical Exam:       Vital Signs Flowsheet:  Temp (F): 98	  Temp (C) Temp (C): 36.7	  Temp site Temp Site: oral	  Heart Rate Heart Rate (beats/min):  119	  BP Systolic Systolic: 126	  BP Diastolic Diastolic (mm Hg): 86	  Respiration Rate (breaths/min) Respiration Rate (breaths/min): 19	  SpO2 (%) SpO2 (%): 99	  Dosing Weight (KILOGRAMS) Dosing Weight (KILOGRAMS): 83.9	  Dosing Weight  (POUNDS) Dosing Weight (POUNDS): 184.9	  Height type Height Type: stated	  Height (FEET) Height (FEET): 5	  Height (INCHES) Height (INCHES): 10	  Height (CENTIMETERS) Height (CENTIMETERS): 177.8	  BSA (m2): 2.02	  BMI (kG/m2) BMI (kG/m2): 26.5	  Presence of Pain: denies pain/discomfort	  Pain Rating (0-10): Rest: 0	  Pain Rating (0-10): Activity: 0	  SpO2 (%) SpO2 (%): 99	  	  	  	  	  Physical Exam:  · Constitutional	detailed exam	  · Constitutional Details	well-developed; well-groomed; well-nourished; no distress	  · Eyes	detailed exam	  · Eyes Details	PERRL; EOMI; conjunctiva clear	  · ENMT	detailed exam	  · ENMT Details	mouth	  · Mouth	moist	  · Neck	detailed exam	  · Neck Details	supple; no JVD; No thyromegaly	  · Respiratory	detailed exam	  · Respiratory Details	airway patent; breath sounds equal; respirations non-labored; no rales; no rhonchi; no wheezes	  · Cardiovascular	detailed exam	  · Cardiovascular Details	no rub  no murmur	  · Cardiovascular Details	tachycardia; positive S1; positive S2	  · Gastrointestinal	detailed exam	  · GI Normal	soft; nontender; no distention; bowel sounds normal	  · Extremities	detailed exam	  · Extremities Details	no clubbing	  · Extremities Comments	b/l LE swelling from calves to ankles	  · Vascular	detailed exam	  · DP Pulse	right normal; left normal	  · PT Pulse	right normal; left normal	  · Neurological	detailed exam	  · Neurological Details	alert and oriented x 3; responds to pain; responds to verbal commands; no spontaneous movement; normal strength	  · Skin	detailed exam	  · Skin Details	warm and dry; color normal	  · Musculoskeletal	detailed exam	  · Musculoskeletal Details	ROM intact	  · Psychiatric	detailed exam	  · Psychiatric Details	normal affect; normal behavior	    Results:   Laboratory:   General Chemistry:	    28-Feb-2017 22:37, CKMB Mass Assay	  CKMB Units: 0.5, [0.5 - 3.6 ng/mL]	  CPK Mass Assay %: 2.2, [0.0 - 3.5 %]	    28-Feb-2017 22:37, Comprehensive Metabolic Panel	  Sodium, Serum: 140, [135 - 145 mmol/L]	  Potassium, Serum:    3.0, [3.5 - 5.3 mmol/L]	  Chloride, Serum: 103, [96 - 108 mmol/L]	  Carbon Dioxide, Serum: 27, [22 - 31 mmol/L]	  Anion Gap, Serum: 10, [5 - 17 mmol/L]	  Blood Urea Nitrogen, Serum: 17, [7 - 23 mg/dL]	  Creatinine, Serum: 0.62, [0.50 - 1.30 mg/dL]	  Glucose, Serum:    127, [70 - 99 mg/dL]	  Calcium, Total Serum:    7.4, [8.5 - 10.1 mg/dL]	  Protein Total, Serum: 6.7, [6.0 - 8.3 gm/dL]	  Albumin, Serum:    2.3, [3.3 - 5.0 g/dL]	  Bilirubin Total, Serum: 0.6, [0.2 - 1.2 mg/dL]	  Alkaline Phosphatase, Serum:    197, [40 - 120 U/L]	  Aspartate Aminotransferase (AST/SGOT): 37, [15 - 37 U/L]	  Alanine Aminotransferase (ALT/SGPT): 21, [12 - 78 U/L]	  eGFR if Non African American: 97, [>=60 mL/min/1.73M2], Interpretative commentThe units for eGFR are ml/min/1.73m2 (normalized body surface area). TheeGFR is calculated from a serum creatinine using the CKD-EPI equation.Other variables required for calculation are race, age and sex. Amongpatients with chronic kidney disease (CKD), the eGFR is useful indetermining the stage of disease according to KDOQI CKD classification.All eGFR results are reported numerically with the followinginterpretation.        GFR                    With                 Without   (ml/min/1.73 m2)    Kidney Damage       Kidney Damage      >= 90                    Stage 1                     Normal      60-89                    Stage 2                     Decreased GFR      30-59     Stage 3                     Stage 3      15-29                    Stage 4                     Stage 4      < 15                      Stage 5                     Stage 5Each stage of CKD assumes that the associated GFR level has been ineffect for at least 3 months. Determination of stages one and two (witheGFR > 59 ml/min/m2) requires estimation of kidney damage for at least 3months as defined by structural or functional abnormalities.Limitations: All estimates of GFR will be less accurate for patients atextremes of muscle mass (including but not limited to frail elderly,critically ill, or cancer patients), those with unusual diets, and thosewith conditions associated with reduced secretion or extrarenalelimination of creatinine. The eGFR equation is not recommended for usein patients with unstable creatinine levels.	  eGFR if : 113, [>=60 mL/min/1.73M2]	    28-Feb-2017 22:37, Creatine Kinase, Serum	  Creatine Kinase, Serum:    23, [26 - 192 U/L]	    28-Feb-2017 22:37, Troponin I, Serum	  Troponin I, Serum: <.015, [.015 - .045 ng/mL], The new reference range for Troponin-I performed on the Siemens [employer]stem is 0.015-0.045 ng/mL, which includes the 99th percentile of [facility] reference population. Studies have shown that elevated troponinlevels above the 99th percentile cutoff are associated with an increasedrisk for adverse cardiac events, with the risk increasing as troponinlevels increase. As per a joint committee of the American College ofCardiology and  Society of Cardiology, diagnosis of classic MI isbased upon the detection of a rise or fall of cardiac troponin values,with at least one value above the 99th percentile upper reference limit,in the appropriate clinical context.Troponin-I (ng/mL) Interpretation0.00-0.045 Normal range (includes the 99th percentile of a healthyreference population)>0.045 Elevated troponin level indicating increased riskNote: Troponin-I and Troponin-T cannot be used interchangeably in serialmeasurements. Minimally elevated Troponin results should be interpretedin the context of clinical findings and risk factors.	  Coagulation:	    28-Feb-2017 22:37, Activated Partial Thromboplastin Time	  Activated Partial Thromboplastin Time: 31.0, [27.5 - 37.4 sec], The recommended therapeutic heparin range (full dose) is 58-99 seconds.Recommended therapeutic Argatroban range is 1.5 to 3.0 times the baselineAPTT value, not to exceed 100 seconds. Recommended therapeutic Refludanrange is 1.5 to 2.5 times thebaseline APTT.	    28-Feb-2017 22:37, Prothrombin Time and INR, Plasma	  Prothrombin Time, Plasma:    14.8, [10.0 - 13.1 sec]	  INR:    1.32, [0.88 - 1.16 ratio], Please note: New Critical Value: 5.0 ratio as of 1/2/14.	  Hematology:	    28-Feb-2017 22:37, Complete Blood Count + Automated Diff	  WBC Count:    13.2, [3.8 - 10.5 K/uL]	  RBC Count: 4.25, [3.80 - 5.20 M/uL]	  Hemoglobin: 13.4, [11.5 - 15.5 g/dL]	  Hematocrit: 40.6, [34.5 - 45.0 %]	  Mean Cell Volume: 95.5, [80.0 - 100.0 fl]	  Mean Cell Hemoglobin: 31.6, [27.0 - 34.0 pg]	  Mean Cell Hemoglobin Conc: 33.1, [32.0 - 36.0 gm/dL]	  Red Cell Distrib Width: 13.5, [11.0 - 15.0 %]	  Platelet Count - Automated: 309, [150 - 400 K/uL]	  Auto Neutrophil #:    11.0, [1.8 - 7.4 K/uL]	  Auto Lymphocyte #: 1.4, [1.0 - 3.3 K/uL]	  Auto Monocyte #: 0.7, [0.0 - 0.9 K/uL]	  Auto Eosinophil #: 0.1, [0.0 - 0.5 K/uL]	  Auto Basophil #: 0.1, [0.0 - 0.2 K/uL]	  Auto Neutrophil %:    82.8, [43.0 - 77.0 %], Differential percentages must be correlated with absolute numbers forclinical significance.	  Auto Lymphocyte %:    10.3, [13.0 - 44.0 %]	  Auto Monocyte %: 5.6, [2.0 - 14.0 %]	  Auto Eosinophil %: 0.5, [0.0 - 6.0 %]	  Auto Basophil %: 0.9, [0.0 - 2.0 %]	    · Lab Results and Interpretation	hypokalemia, supplemented	    Radiology:   X-Ray, Fluoroscopy:	    28-Feb-2017 22:50, US Duplex Venous Lower Ext Ltd, Left	  PACS Image: Image(s) Available	  Non-Obstetrical Ultrasounds:	  US Duplex Venous Lower Ext Ltd, Left: EXAM:  US DPLX LWR EXT VEINS LTD LT                      PROCEDURE DATE:  02/28/2017  INTERPRETATION:  History: Left leg swelling.Technique: Grayscale and color Doppler images of the left lower extremity were obtained.No priors.Findings:Occlusive deep vein thrombus is present within the greater saphenous, common femoral, femoral, popliteal, peroneal, and posterior tibial veins.Impression:Occlusive deep vein thrombus is present within the greater saphenous, common femoral, femoral, popliteal, peroneal, and posterior tibial veins.Findings discussed with Dr. Escalera of emergency room at the time dictation.MAYANK AHMADI M.D., ATTENDING RADIOLOGISTThis document has been electronically signed. Feb 28 2017 10:55PM	    · Radiology Results and Interpretation	DVT + LE, will get CTA chest as CXR shows wedge-shaped opacity on the right consistent with patient's pleuritic chest pain.

## 2017-03-01 NOTE — OCCUPATIONAL THERAPY INITIAL EVALUATION ADULT - PERTINENT HX OF CURRENT PROBLEM, REHAB EVAL
62 y/o female admitted to [facility] with c/o pain and swelling in LLE for 1 week. Ultrasound of LLE done on 2/28/17 and revealed Occlusive DVT presnent.

## 2017-03-01 NOTE — H&P ADULT. - HISTORY OF PRESENT ILLNESS
61 year old woman with PMH HTN, history of stomach and thyroid cancer (neither active as per family) spinal lesion (unclear etiology, followed q 1 month with MRI as per family) and paraplegia presents with complaint of b/l leg swelling for unknown amount of time.  Family states that they noticed the patient's feet were swollen for the last few weeks and advised her to see her doctor but the patient had trouble leaving her home due to her paralysis.  She also complains of cough with pleuritic chest pain x 1 week.  She denies fever, chills, wheezing, SOB.      LE US positive for b/l DVT.

## 2017-03-01 NOTE — OCCUPATIONAL THERAPY INITIAL EVALUATION ADULT - RANGE OF MOTION EXAMINATION, LOWER EXTREMITY
LLE AROM hip flexion 0-40 supine in bed, LLE AROM  knee flexion 0-40 supine in bed. LLE AROM distally to knee WFL/Right LE Active ROM was WFL   (within functional limits)/Right LE Passive ROM was WFL  (within functional limits)/Left LE Passive ROM was WFL (w/i functional limits)

## 2017-03-02 ENCOUNTER — RESULT REVIEW (OUTPATIENT)
Age: 62
End: 2017-03-02

## 2017-03-02 DIAGNOSIS — R53.1 WEAKNESS: ICD-10-CM

## 2017-03-02 LAB
ALDOST SERPL-MCNC: 3.6 NG/DL — SIGNIFICANT CHANGE UP
ALDOST SERPL-MCNC: 5.9 NG/DL — SIGNIFICANT CHANGE UP
ANION GAP SERPL CALC-SCNC: 8 MMOL/L — SIGNIFICANT CHANGE UP (ref 5–17)
APTT BLD: 142.3 SEC — CRITICAL HIGH (ref 27.5–37.4)
APTT BLD: 52.7 SEC — HIGH (ref 27.5–37.4)
APTT BLD: 81 SEC — HIGH (ref 27.5–37.4)
BLD GP AB SCN SERPL QL: SIGNIFICANT CHANGE UP
BUN SERPL-MCNC: 11 MG/DL — SIGNIFICANT CHANGE UP (ref 7–23)
CALCIUM SERPL-MCNC: 7.1 MG/DL — LOW (ref 8.5–10.1)
CHLORIDE SERPL-SCNC: 102 MMOL/L — SIGNIFICANT CHANGE UP (ref 96–108)
CO2 SERPL-SCNC: 28 MMOL/L — SIGNIFICANT CHANGE UP (ref 22–31)
CORTIS AM PEAK SERPL-MCNC: 17.1 UG/DL — SIGNIFICANT CHANGE UP (ref 6–18.4)
CREAT SERPL-MCNC: 0.41 MG/DL — LOW (ref 0.5–1.3)
GLUCOSE SERPL-MCNC: 88 MG/DL — SIGNIFICANT CHANGE UP (ref 70–99)
HCT VFR BLD CALC: 32.1 % — LOW (ref 34.5–45)
HCT VFR BLD CALC: 32.6 % — LOW (ref 34.5–45)
HGB BLD-MCNC: 11 G/DL — LOW (ref 11.5–15.5)
HGB BLD-MCNC: 11.3 G/DL — LOW (ref 11.5–15.5)
MCHC RBC-ENTMCNC: 32.4 PG — SIGNIFICANT CHANGE UP (ref 27–34)
MCHC RBC-ENTMCNC: 32.6 PG — SIGNIFICANT CHANGE UP (ref 27–34)
MCHC RBC-ENTMCNC: 34.3 GM/DL — SIGNIFICANT CHANGE UP (ref 32–36)
MCHC RBC-ENTMCNC: 34.6 GM/DL — SIGNIFICANT CHANGE UP (ref 32–36)
MCV RBC AUTO: 93.6 FL — SIGNIFICANT CHANGE UP (ref 80–100)
MCV RBC AUTO: 94.9 FL — SIGNIFICANT CHANGE UP (ref 80–100)
PLATELET # BLD AUTO: 215 K/UL — SIGNIFICANT CHANGE UP (ref 150–400)
PLATELET # BLD AUTO: 232 K/UL — SIGNIFICANT CHANGE UP (ref 150–400)
POTASSIUM SERPL-MCNC: 3.3 MMOL/L — LOW (ref 3.5–5.3)
POTASSIUM SERPL-SCNC: 3.3 MMOL/L — LOW (ref 3.5–5.3)
RBC # BLD: 3.38 M/UL — LOW (ref 3.8–5.2)
RBC # BLD: 3.48 M/UL — LOW (ref 3.8–5.2)
RBC # FLD: 13.3 % — SIGNIFICANT CHANGE UP (ref 11–15)
RBC # FLD: 13.6 % — SIGNIFICANT CHANGE UP (ref 11–15)
SODIUM SERPL-SCNC: 138 MMOL/L — SIGNIFICANT CHANGE UP (ref 135–145)
WBC # BLD: 10.7 K/UL — HIGH (ref 3.8–10.5)
WBC # BLD: 8.3 K/UL — SIGNIFICANT CHANGE UP (ref 3.8–10.5)
WBC # FLD AUTO: 10.7 K/UL — HIGH (ref 3.8–10.5)
WBC # FLD AUTO: 8.3 K/UL — SIGNIFICANT CHANGE UP (ref 3.8–10.5)

## 2017-03-02 PROCEDURE — 99233 SBSQ HOSP IP/OBS HIGH 50: CPT

## 2017-03-02 PROCEDURE — 76536 US EXAM OF HEAD AND NECK: CPT | Mod: 26

## 2017-03-02 RX ORDER — SODIUM,POTASSIUM PHOSPHATES 278-250MG
1 POWDER IN PACKET (EA) ORAL
Qty: 0 | Refills: 0 | Status: DISCONTINUED | OUTPATIENT
Start: 2017-03-02 | End: 2017-03-02

## 2017-03-02 RX ORDER — MAGNESIUM SULFATE 500 MG/ML
2 VIAL (ML) INJECTION ONCE
Qty: 0 | Refills: 0 | Status: COMPLETED | OUTPATIENT
Start: 2017-03-02 | End: 2017-03-02

## 2017-03-02 RX ORDER — POTASSIUM CHLORIDE 20 MEQ
20 PACKET (EA) ORAL
Qty: 0 | Refills: 0 | Status: COMPLETED | OUTPATIENT
Start: 2017-03-02 | End: 2017-03-03

## 2017-03-02 RX ORDER — MAGNESIUM OXIDE 400 MG ORAL TABLET 241.3 MG
400 TABLET ORAL
Qty: 0 | Refills: 0 | Status: DISCONTINUED | OUTPATIENT
Start: 2017-03-02 | End: 2017-03-02

## 2017-03-02 RX ORDER — SODIUM,POTASSIUM PHOSPHATES 278-250MG
1 POWDER IN PACKET (EA) ORAL
Qty: 0 | Refills: 0 | Status: DISCONTINUED | OUTPATIENT
Start: 2017-03-02 | End: 2017-03-03

## 2017-03-02 RX ADMIN — Medication 50 GRAM(S): at 12:55

## 2017-03-02 RX ADMIN — Medication 1 GRAM(S): at 14:27

## 2017-03-02 RX ADMIN — Medication 0.5 MILLIGRAM(S): at 14:27

## 2017-03-02 RX ADMIN — Medication 20 MILLIEQUIVALENT(S): at 18:12

## 2017-03-02 RX ADMIN — HEPARIN SODIUM 1700 UNIT(S)/HR: 5000 INJECTION INTRAVENOUS; SUBCUTANEOUS at 14:32

## 2017-03-02 RX ADMIN — GABAPENTIN 600 MILLIGRAM(S): 400 CAPSULE ORAL at 06:43

## 2017-03-02 RX ADMIN — HEPARIN SODIUM 1500 UNIT(S)/HR: 5000 INJECTION INTRAVENOUS; SUBCUTANEOUS at 02:06

## 2017-03-02 RX ADMIN — GABAPENTIN 600 MILLIGRAM(S): 400 CAPSULE ORAL at 14:27

## 2017-03-02 RX ADMIN — Medication 1 TABLET(S): at 21:37

## 2017-03-02 RX ADMIN — Medication 1 TABLET(S): at 12:55

## 2017-03-02 RX ADMIN — Medication 1 GRAM(S): at 06:43

## 2017-03-02 RX ADMIN — Medication 0.5 MILLIGRAM(S): at 06:45

## 2017-03-02 RX ADMIN — Medication 1 TABLET(S): at 18:12

## 2017-03-02 RX ADMIN — HEPARIN SODIUM 1400 UNIT(S)/HR: 5000 INJECTION INTRAVENOUS; SUBCUTANEOUS at 22:49

## 2017-03-02 RX ADMIN — AMLODIPINE BESYLATE 10 MILLIGRAM(S): 2.5 TABLET ORAL at 06:43

## 2017-03-02 RX ADMIN — Medication 1 GRAM(S): at 21:38

## 2017-03-02 RX ADMIN — HEPARIN SODIUM 3000 UNIT(S): 5000 INJECTION INTRAVENOUS; SUBCUTANEOUS at 14:40

## 2017-03-02 RX ADMIN — Medication 200 MICROGRAM(S): at 06:43

## 2017-03-02 RX ADMIN — GABAPENTIN 600 MILLIGRAM(S): 400 CAPSULE ORAL at 21:37

## 2017-03-02 RX ADMIN — Medication 0.5 MILLIGRAM(S): at 21:37

## 2017-03-02 RX ADMIN — Medication 200 MILLIGRAM(S): at 19:49

## 2017-03-02 RX ADMIN — HEPARIN SODIUM 0 UNIT(S)/HR: 5000 INJECTION INTRAVENOUS; SUBCUTANEOUS at 21:29

## 2017-03-02 NOTE — PROGRESS NOTE ADULT - SUBJECTIVE AND OBJECTIVE BOX
Patient seen and examined bedside resting comfortably, with granddaughters present.  No acute complaints. +cough.     T(F): 97.6, Max: 99.8 (03-02 @ 01:20)  HR: 94 (86 - 102)  BP: 122/70 (115/66 - 134/77)  RR: 16 (16 - 20)  SpO2: 97% (96% - 97%)    PHYSICAL EXAM:  General: NAD, WDWN  Neuro:  Alert & oriented  HEENT: NCAT, EOMI, conjunctiva clear  CV: +S1+S2 regular rate and rhythm  Lung: clear b/l, respirations nonlabored  Abdomen: soft, NTND.   Extremities: severe left lower extremity edema    LABS:                        11.0   8.3   )-----------( 215      ( 02 Mar 2017 07:30 )             32.1     02 Mar 2017 07:30    138    |  102    |  11     ----------------------------<  88     3.3     |  28     |  0.41     Ca    7.1        02 Mar 2017 07:30  Phos  2.4       02 Mar 2017 07:30  Mg     1.6       02 Mar 2017 07:30    TPro  6.7    /  Alb  2.3    /  TBili  0.6    /  DBili  x      /  AST  37     /  ALT  21     /  AlkPhos  197    28 Feb 2017 22:37  PT/INR - ( 28 Feb 2017 22:37 )   PT: 14.8 sec;   INR: 1.32 ratio    PTT - ( 02 Mar 2017 10:40 )  PTT:52.7 sec      Impression: 61F PMH HTN, h/o stomach and thyroid cancer, spinal lesion (unclear etiology, followed q 1 month with MRI as per family) and paraplegia admitted with LLE DVT and found to have right hilar lung mass    Plan:  -continue heparin gtt and present medical management, heme Follow up noted  -continue PT and OT  -OR planning for tomorrow for bronchoscopy/biopsy and IVC filter - preop labs, hold heparin drip at 4am, npo p midnight.  -discussed with Dr Kern and Dr Wall, consents to be obtained by surgeons Pt admitted in the Emergency Department with acute left deep venous thrombosis.    Patient seen and examined   On IV heparin  with granddaughters present.    SYMPTOMS    Cough+ no hemoptysis  No chest pain  No fever  No SOB  C/O swelling left leg and discomfort        VITALS      T(F): 97.6, Max: 99.8 (03-02 @ 01:20)  HR: 94 (86 - 102)  BP: 122/70 (115/66 - 134/77)  RR: 16 (16 - 20)  SpO2: 97% (96% - 97%)    PHYSICAL EXAM:    General: NAD, WDWN  Neuro:  Alert & oriented  HEENT: NCAT, EOMI, conjunctiva clear  CV: +S1+S2 regular rate and rhythm  Lung: clear b/l, respirations nonlabored  Abdomen: soft, NTND.   Extremities: severe left lower extremity edema.  Warm well perfused        LABS:                        11.0   8.3   )-----------( 215      ( 02 Mar 2017 07:30 )             32.1     02 Mar 2017 07:30    138    |  102    |  11     ----------------------------<  88     3.3     |  28     |  0.41     Ca    7.1        02 Mar 2017 07:30  Phos  2.4       02 Mar 2017 07:30  Mg     1.6       02 Mar 2017 07:30    TPro  6.7    /  Alb  2.3    /  TBili  0.6    /  DBili  x      /  AST  37     /  ALT  21     /  AlkPhos  197    28 Feb 2017 22:37  PT/INR - ( 28 Feb 2017 22:37 )   PT: 14.8 sec;   INR: 1.32 ratio    PTT - ( 02 Mar 2017 10:40 )  PTT:52.7 sec        Impression:     61F PMH HTN, h/o stomach and thyroid cancer, spinal lesion (unclear etiology, followed q 1 month with MRI as per family) and paraplegia admitted with LLE DVT and found to have right hilar lung mass    Plan:  -continue heparin gtt and present medical management, heme Follow up noted  -continue PT and OT  -OR planning for tomorrow for bronchoscopy/biopsy and IVC filter - preop labs, hold heparin drip at 4am, npo p midnight.  -D/W daighters and pt.  Coordination of care with the pulm and vascular

## 2017-03-02 NOTE — PROGRESS NOTE ADULT - PROBLEM SELECTOR PLAN 2
on heparin gtt  will not start Oral AC or lovenox at this time as pt. to have IVC filter on 3/3 and lung biopsy on 3/6.  For IVC fllter in am, d/w Dr. Kern.  will hold heparin drip in am for procedure.

## 2017-03-02 NOTE — PROGRESS NOTE ADULT - PROBLEM SELECTOR PLAN 7
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  neuro eval Dr. Gray requested  will check B12 and Copper level.

## 2017-03-02 NOTE — PROGRESS NOTE ADULT - PROBLEM SELECTOR PLAN 6
Pt. with h/o gastric ca x 5 yr back s/p partial gastrectomy and chemo as per daughter.    Having difficulty to walk due to LE weakness x almost 1 year but worsened over last 6 months , mostly bed bound for almost 6 months, follows with Neuro as outpatient and gets frequent MRI to f/u on spine lesion which resulted in LE weakness.  Daughter interested in biopsy.

## 2017-03-02 NOTE — PROGRESS NOTE ADULT - PROBLEM SELECTOR PLAN 1
Newly Dx lung mass, suspected malignancy  Pulmonary and CT surgeon following  IR evaluation appreciated  for lung biopsy on Monday By Dr. Monroe

## 2017-03-02 NOTE — CONSULT NOTE ADULT - SUBJECTIVE AND OBJECTIVE BOX
Subjective Complaints:  Historian:       Consult requested by ER doctor:                  Attending:  Susan    HPI:  PRISCILLA MCFADDEN. 61 year old woman with PMH HTN, history of stomach and thyroid cancer (neither active as per family) spinal lesion (unclear etiology, followed q 1 month with MRI as per family) and paraplegia presents with complaint of b/l leg swelling for unknown amount of time.  Family states that they noticed the patient's feet were swollen for the last few weeks and advised her to see her doctor but the patient had trouble leaving her home due to her paralysis.  She also complains of cough with pleuritic chest pain x 1 week.  She denies fever, chills, wheezing, SOB.      LE US positive for b/l DVT. (01 Mar 2017 00:23)    PRISCILLA MCFADDEN    PAST MEDICAL & SURGICAL HISTORY:  GERD (gastroesophageal reflux disease)  Thyroid cancer  Stomach cancer  HTN (hypertension)  No significant past surgical history  61yFemale    MEDICATIONS  (STANDING):  gabapentin 600milliGRAM(s) Oral three times a day  LORazepam     Tablet 0.5milliGRAM(s) Oral three times a day  amLODIPine   Tablet 10milliGRAM(s) Oral daily  sucralfate suspension 1Gram(s) Oral three times a day  levothyroxine 200MICROGram(s) Oral daily  sodium chloride 0.45%. 1000milliLiter(s) IV Continuous <Continuous>  influenza   Vaccine 0.5milliLiter(s) IntraMuscular once  heparin  Infusion. Unit(s)/Hr IV Continuous <Continuous>  potassium chloride   Powder 20milliEquivalent(s) Oral two times a day  potassium acid phosphate/sodium acid phosphate tablet (K-PHOS No. 2) 1Tablet(s) Oral four times a day with meals    MEDICATIONS  (PRN):  heparin  Injectable 6500Unit(s) IV Push every 6 hours PRN For aPTT less than 40  heparin  Injectable 3000Unit(s) IV Push every 6 hours PRN For aPTT between 40 - 57  oxyCODONE  5 mG/acetaminophen 325 mG 1Tablet(s) Oral every 6 hours PRN Severe Pain (7 - 10)  acetaminophen   Tablet 650milliGRAM(s) Oral every 6 hours PRN For Temp greater than 38 C (100.4 F)  guaiFENesin    Syrup 200milliGRAM(s) Oral every 6 hours PRN Cough      Allergies    No Known Allergies    Intolerances      FAMILY HISTORY:  No pertinent family history in first degree relatives    Vital Signs Last 24 Hrs  T(C): 37, Max: 37.7 (03-02 @ 01:20)  T(F): 98.6, Max: 99.8 (03-02 @ 01:20)  HR: 86 (86 - 107)  BP: 115/66 (115/66 - 134/77)  BP(mean): 70 (70 - 70)  RR: 19 (17 - 20)  SpO2: 96% (95% - 97%)    NEUROLOGICAL EXAM:  HENT:  Normocephalic head; atraumatic head.  Neck supple.  ENT: normal looking.  Mental State:    Alert.  Fully oriented to person, place and date.  Coherent.  Speech clear and intact.  Cooperative.  Responds appropriately.    Cranial Nerves:  II-XII:   Pupils round and reactive to light and accommodation.  Extraocular movements full.  Visual fields full (no homonymous hemianopsia).  Visual acuity wnl.  Facial symmetry intact.  Tongue midline.  Motor Functions:  Moves all extremities.  No pronator drift of UE.  Claps hand well.  Hand  intact bilaterally.  Ambulatory.    Sensory Functions:   Intact to touch and pinprick to face and extremities.    Reflexes:  Deep tendon reflexes normoactive to biceps, knees and ankles.  Babinski absent (present).  Cerebellar Testing:    Finger to nose intact.  Nystagmus absent.  Neurovascular: Carotid auscultation full without bruits.      LABS:                        11.0   8.3   )-----------( 215      ( 02 Mar 2017 07:30 )             32.1     02 Mar 2017 07:30    138    |  102    |  11     ----------------------------<  88     3.3     |  28     |  0.41     Ca    7.1        02 Mar 2017 07:30  Phos  2.4       02 Mar 2017 07:30  Mg     1.6       02 Mar 2017 07:30    TPro  6.7    /  Alb  2.3    /  TBili  0.6    /  DBili  x      /  AST  37     /  ALT  21     /  AlkPhos  197    28 Feb 2017 22:37    PT/INR - ( 28 Feb 2017 22:37 )   PT: 14.8 sec;   INR: 1.32 ratio         PTT - ( 02 Mar 2017 10:40 )  PTT:52.7 sec        RADIOLOGY & ADDITIONAL STUDIES:    Adrenocorticotropic Hormone, Serum: AM Sched. Collection: 02-Mar-2017 05:00 (03-01 @ 20:39)  Cortisol AM, Serum: AM Sched. Collection: 02-Mar-2017 05:00 (03-01 @ 20:39)  Aldosterone, Serum: AM Sched. Collection: 02-Mar-2017 05:00 (03-01 @ 20:39)  Metanephrine, Plasma: AM Sched. Collection: 02-Mar-2017 05:00 (03-01 @ 20:39)  Basic Metabolic Panel: AM Sched. Collection: 02-Mar-2017 05:00 (03-01 @ 20:39)  Activated Partial Thromboplastin Time:  Start Date:01-Mar-2017. STAT (03-01 @ 22:49)  Complete Blood Count: Repeat From: 02-Mar-2017 00:00 To: 31-Mar-2017 05:00, Every 1 day(s) (03-01 @ 22:49)  Complete Blood Count: STAT (03-01 @ 22:49)  Complete Blood Count: AM Sched. Collection: 02-Mar-2017 05:00 (03-02 @ 00:02)  Complete Blood Count: AM Sched. Collection: 02-Mar-2017 05:00  Cancel Reason: Dup Cancel (03-02 @ 00:02)  Magnesium, Serum: STAT  Cancel Reason: -Lab Reordered (03-02 @ 06:28)  Phosphorus Level, Serum: STAT  Cancel Reason: -Lab Reordered (03-02 @ 06:28)  Activated Partial Thromboplastin Time:  Start Date:02-Mar-2017. STAT (03-02 @ 07:37)  Magnesium, Serum: 06:41 (03-02 @ 07:44)  Phosphorus Level, Serum: 06:41 (03-02 @ 07:44)  potassium chloride   Powder: [Known as KLOR-CON POWDER]  20 milliEquivalent(s), Oral, two times a day, Stop After 2 Doses  Administration Instructions: Dissolve contents of 1 packet in at least 4 oz. of water or other beverage.  Provider&#x27;s Contact #: [phone] (03-02 @ 08:48)  magnesium oxide: [Known as MAG-]  400 milliGRAM(s), Oral, three times a day with meals, Stop After 1 Days  Administration Instructions: each tab contains 241.3mG magnesium  Provider&#x27;s Contact #: [phone] (03-02 @ 08:48) [discontinued]  potassium acid phosphate/sodium acid phosphate tablet (K-PHOS No. 2):   1 Tablet(s), Oral, two times a day with meals, Stop After 2 Doses  Administration Instructions: *Each tablet supplies 250 mG of Phosphorus*  Provider&#x27;s Contact #: [phone] (03-02 @ 08:48) [discontinued]  Consult- Vascular:    Reason for Consult: for IVC filter placement   Team Name: Private MD (03-02 @ 08:51)  Thyroglobulin Reflex to MS or IA: AM Sched. Collection: 03-Mar-2017 05:00 (03-02 @ 09:00)  Thyroglobulin Antibodies: AM Sched. Collection: 03-Mar-2017 05:00 (03-02 @ 09:00)  US Head + Neck Soft Tissue: Routine   Indication: hx thyroid ca  Transport: Stretcher-Crib  Exam Completed  Provider&#x27;s Contact #: [phone] (03-02 @ 09:05)  Diet, NPO:   NPO for Procedure/Test     NPO Start Date: 02-Mar-2017,   NPO Start Time: 09:00  Except Medications (03-02 @ 09:15) [discontinued]  potassium acid phosphate/sodium acid phosphate tablet (K-PHOS No. 2):   1 Tablet(s), Oral, four times a day with meals, Stop After 3 Days  Administration Instructions: *Each tablet supplies 250 mG of Phosphorus*  Provider&#x27;s Contact #: [phone] (03-02 @ 10:16)  magnesium sulfate  IVPB:   2 Gram(s) in sterile water 50 milliLiter(s), IV Intermittent, once, infuse over 60 Minute(s), Stop After 1 Doses  Provider&#x27;s Contact #: [phone] (03-02 @ 10:16) [completed]  Consult- Neurology:    Reason for Consult: b/l LE weakness   Team Name: Private MD (03-02 @ 10:18)  Copper, Serum: Routine (03-02 @ 14:20)  Vitamin B12, Serum: Routine (03-02 @ 14:20)  Complete Blood Count: AM Sched. Collection: 03-Mar-2017 05:00 (03-02 @ 14:20)  Basic Metabolic Panel: AM Sched. Collection: 03-Mar-2017 05:00 (03-02 @ 14:20)  Magnesium, Serum: AM Sched. Collection: 03-Mar-2017 05:00 (03-02 @ 14:20)  Phosphorus Level, Serum: AM Sched. Collection: 03-Mar-2017 05:00 (03-02 @ 14:20)  Provider to RN:       HOLD heparin drip tomorrow morning starting at 4 AM for IVC filter tomorrow. (03-02 @ 14:21)  Diet, NPO after Midnight:      NPO Start Date: 02-Mar-2017,   NPO Start Time: 23:59 (03-02 @ 14:21)  Diet, NPO after Midnight:      NPO Start Date: 02-Mar-2017,   NPO Start Time: 23:59 (03-02 @ 14:28)      Assessment & Opinion:    Recommendations:  Brain MRI.  Carotid doppler.  Echocardiogram.  EEG.   DVT prophylaxis as ordered.  Medications: Subjective Complaints:  Historian:   Patient.  ER notes also reviewed.    Consult requested by  Attending:  Susan    HPI:  PRISCILLA MCFADDEN. 61 year old woman with multiple medical surgical PMH of HTN, history of stomach and thyroid cancer (neither active as per family) spinal lesion (unclear etiology, followed q 1 month with MRI as per family) and paraplegia presents with complaint of b/l leg swelling for unknown amount of time.  Family states that they noticed the patient's feet were swollen for the last few weeks and advised her to see her doctor but the patient had trouble leaving her home due to her paralysis.  She also complains of cough with pleuritic chest pain x 1 week.  She denies fever, chills, wheezing, SOB.      Since admission, patient underwent the following studies: Brain CT, chest CT, Leg DVT sonogram, etc.  Reportedly, she was treated for "low serum copper levels."    LE US positive for b/l DVT. (01 Mar 2017 00:23)     PAST MEDICAL & SURGICAL HISTORY:  GERD (gastroesophageal reflux disease)  Thyroid cancer  Stomach cancer  HTN (hypertension)  No significant past surgical history  61yFemale    MEDICATIONS  (STANDING):  gabapentin 600milliGRAM(s) Oral three times a day  LORazepam     Tablet 0.5milliGRAM(s) Oral three times a day  amLODIPine   Tablet 10milliGRAM(s) Oral daily  sucralfate suspension 1Gram(s) Oral three times a day  levothyroxine 200MICROGram(s) Oral daily  sodium chloride 0.45%. 1000milliLiter(s) IV Continuous <Continuous>  influenza   Vaccine 0.5milliLiter(s) IntraMuscular once  heparin  Infusion. Unit(s)/Hr IV Continuous <Continuous>  potassium chloride   Powder 20milliEquivalent(s) Oral two times a day  potassium acid phosphate/sodium acid phosphate tablet (K-PHOS No. 2) 1Tablet(s) Oral four times a day with meals    MEDICATIONS  (PRN):  heparin  Injectable 6500Unit(s) IV Push every 6 hours PRN For aPTT less than 40  heparin  Injectable 3000Unit(s) IV Push every 6 hours PRN For aPTT between 40 - 57  oxyCODONE  5 mG/acetaminophen 325 mG 1Tablet(s) Oral every 6 hours PRN Severe Pain (7 - 10)  acetaminophen   Tablet 650milliGRAM(s) Oral every 6 hours PRN For Temp greater than 38 C (100.4 F)  guaiFENesin    Syrup 200milliGRAM(s) Oral every 6 hours PRN Cough    Allergies: No Known Allergies    FAMILY HISTORY:  No pertinent family history in first degree relatives    Vital Signs Last 24 Hrs  T(C): 37, Max: 37.7 (03-02 @ 01:20)  T(F): 98.6, Max: 99.8 (03-02 @ 01:20)  HR: 86 (86 - 107)  BP: 115/66 (115/66 - 134/77)  BP(mean): 70 (70 - 70)  RR: 19 (17 - 20)  SpO2: 96% (95% - 97%)    NEUROLOGICAL EXAM:  HENT:  Normocephalic head; atraumatic head.  Neck supple.  ENT: normal looking.  Mental State:    Awake and tired.  Fully oriented to person, place and date.  Coherent.  Speech clear and intact.  Cooperative.  Responds appropriately.    Cranial Nerves:  II-XII:   Pupils round and reactive to light and accommodation.  Extraocular movements full.  Visual fields full (no homonymous hemianopsia).  Visual acuity wnl.  Facial symmetry intact.  Tongue midline.  Motor Functions:  Moves all extremities.  No pronator drift of UE.  Claps hands well.  Hand  intact bilaterally.  Lower extremities with left swollen and bigger than the right leg.    Sensory Functions:   Intact to touch but reduced sensation of both legs to pin.      Reflexes:  Deep tendon reflexes hypoactive to both knees and ankles.  Babinski absent.   Cerebellar Testing:    Finger to nose intact.  Neurovascular: Carotid auscultation full without bruits.      LABS:                        11.0   8.3   )-----------( 215      ( 02 Mar 2017 07:30 )             32.1     02 Mar 2017 07:30    138    |  102    |  11     ----------------------------<  88     3.3     |  28     |  0.41     Ca    7.1        02 Mar 2017 07:30  Phos  2.4       02 Mar 2017 07:30  Mg     1.6       02 Mar 2017 07:30    TPro  6.7    /  Alb  2.3    /  TBili  0.6    /  DBili  x      /  AST  37     /  ALT  21     /  AlkPhos  197    28 Feb 2017 22:37    PT/INR - ( 28 Feb 2017 22:37 )   PT: 14.8 sec;   INR: 1.32 ratio         PTT - ( 02 Mar 2017 10:40 )  PTT:52.7 sec    RADIOLOGY & ADDITIONAL STUDIES:    Adrenocorticotropic Hormone, Serum: AM Sched. Collection: 02-Mar-2017 05:00 (03-01 @ 20:39)  Cortisol AM, Serum: AM Sched. Collection: 02-Mar-2017 05:00 (03-01 @ 20:39)  Aldosterone, Serum: AM Sched. Collection: 02-Mar-2017 05:00 (03-01 @ 20:39)  Metanephrine, Plasma: AM Sched. Collection: 02-Mar-2017 05:00 (03-01 @ 20:39)  Basic Metabolic Panel: AM Sched. Collection: 02-Mar-2017 05:00 (03-01 @ 20:39)  Activated Partial Thromboplastin Time:  Start Date:01-Mar-2017. STAT (03-01 @ 22:49)  Complete Blood Count: Repeat From: 02-Mar-2017 00:00 To: 31-Mar-2017 05:00, Every 1 day(s) (03-01 @ 22:49)  Complete Blood Count: STAT (03-01 @ 22:49)  Complete Blood Count: AM Sched. Collection: 02-Mar-2017 05:00 (03-02 @ 00:02)  Complete Blood Count: AM Sched. Collection: 02-Mar-2017 05:00  Cancel Reason: Dup Cancel (03-02 @ 00:02)  Magnesium, Serum: STAT  Cancel Reason: -Lab Reordered (03-02 @ 06:28)  Phosphorus Level, Serum: STAT  Cancel Reason: -Lab Reordered (03-02 @ 06:28)  Activated Partial Thromboplastin Time:  Start Date:02-Mar-2017. STAT (03-02 @ 07:37)  Magnesium, Serum: 06:41 (03-02 @ 07:44)  Phosphorus Level, Serum: 06:41 (03-02 @ 07:44)  potassium chloride   Powder: [Known as KLOR-CON POWDER]  20 milliEquivalent(s), Oral, two times a day, Stop After 2 Doses  Administration Instructions: Dissolve contents of 1 packet in at least 4 oz. of water or other beverage.  Provider&#x27;s Contact #: [phone] (03-02 @ 08:48)  magnesium oxide: [Known as MAG-]  400 milliGRAM(s), Oral, three times a day with meals, Stop After 1 Days  Administration Instructions: each tab contains 241.3mG magnesium  Provider&#x27;s Contact #: [phone] (03-02 @ 08:48) [discontinued]  potassium acid phosphate/sodium acid phosphate tablet (K-PHOS No. 2):   1 Tablet(s), Oral, two times a day with meals, Stop After 2 Doses  Administration Instructions: *Each tablet supplies 250 mG of Phosphorus*  Provider&#x27;s Contact #: [phone] (03-02 @ 08:48) [discontinued]  Consult- Vascular:    Reason for Consult: for IVC filter placement   Team Name: Private MD (03-02 @ 08:51)  Thyroglobulin Reflex to MS or IA: AM Sched. Collection: 03-Mar-2017 05:00 (03-02 @ 09:00)  Thyroglobulin Antibodies: AM Sched. Collection: 03-Mar-2017 05:00 (03-02 @ 09:00)  US Head + Neck Soft Tissue: Routine   Indication: hx thyroid ca  Transport: Stretcher-Crib  Exam Completed  Provider&#x27;s Contact #: [phone] (03-02 @ 09:05)  Diet, NPO:   NPO for Procedure/Test     NPO Start Date: 02-Mar-2017,   NPO Start Time: 09:00  Except Medications (03-02 @ 09:15) [discontinued]  potassium acid phosphate/sodium acid phosphate tablet (K-PHOS No. 2):   1 Tablet(s), Oral, four times a day with meals, Stop After 3 Days  Administration Instructions: *Each tablet supplies 250 mG of Phosphorus*  Provider&#x27;s Contact #: [phone] (03-02 @ 10:16)  magnesium sulfate  IVPB:   2 Gram(s) in sterile water 50 milliLiter(s), IV Intermittent, once, infuse over 60 Minute(s), Stop After 1 Doses  Provider&#x27;s Contact #: [phone] (03-02 @ 10:16) [completed]  Consult- Neurology:    Reason for Consult: b/l LE weakness   Team Name: Private MD (03-02 @ 10:18)  Copper, Serum: Routine (03-02 @ 14:20)  Vitamin B12, Serum: Routine (03-02 @ 14:20)  Complete Blood Count: AM Sched. Collection: 03-Mar-2017 05:00 (03-02 @ 14:20)  Basic Metabolic Panel: AM Sched. Collection: 03-Mar-2017 05:00 (03-02 @ 14:20)  Magnesium, Serum: AM Sched. Collection: 03-Mar-2017 05:00 (03-02 @ 14:20)  Phosphorus Level, Serum: AM Sched. Collection: 03-Mar-2017 05:00 (03-02 @ 14:20)  Provider to RN:       HOLD heparin drip tomorrow morning starting at 4 AM for IVC filter tomorrow. (03-02 @ 14:21)  Diet, NPO after Midnight:      NPO Start Date: 02-Mar-2017,   NPO Start Time: 23:59 (03-02 @ 14:21)  Diet, NPO after Midnight:      NPO Start Date: 02-Mar-2017,   NPO Start Time: 23:59 (03-02 @ 14:28)    Assessment & Opinion:  By history, Lumbosacral spine derangement.  Paraplegia, etiology to be further investigated.  Suspicion for pulmonary lesion.  Paraneoplastic polyneuropathy.    Recommendations:  Brain MRI and Lumbosacral spine MRI.  DVT therapy.  PT/OT follow up.  Medications:  Continue all meducations as prescribed.

## 2017-03-02 NOTE — PROGRESS NOTE ADULT - SUBJECTIVE AND OBJECTIVE BOX
INTERVAL HPI/OVERNIGHT EVENTS:  61 year old woman with PMH HTN, history of stomach CA s/p subtotal gastrectomy 5 years ago, and thyroid cancer s/p surgery 17 years ago (neither active as per family)Cervical spinal lesion (unclear etiology, followed q 1 month with MRI as per family) and bilateral LE weakness with neuropathy, with limited mobility. Presented with complaint of b/l leg swelling for unknown amount of time.  Family states that they noticed the patient's feet were swollen for the last few weeks and advised her to see her doctor but the patient had trouble leaving her home due to her LE weakness.  She also complains of cough with pleuritic chest pain x 1 week.  She denies fever, chills, wheezing, SOB.    Found to have extensive right leg DVT and right lung mass.  Awake, comfortable without any distress. Seen by vascular surgery.    Vital Signs Last 24 Hrs  T(C): 37, Max: 37.7 (03-02 @ 01:20)  T(F): 98.6, Max: 99.8 (03-02 @ 01:20)  HR: 89 (89 - 107)  BP: 116/65 (116/65 - 134/77)  BP(mean): 70 (70 - 70)  RR: 20 (17 - 20)  SpO2: 96% (95% - 97%)    PHYSICAL EXAM:  GEN:        Awake, responsive and comfortable.  HEENT:    Normal.    RESP:     no wheezing.  CVS:             Regular rate and rhythm.   ABD:         Soft, non-tender, non-distended;   :             No costovertebral angle tenderness  EXTR:            No clubbing, cyanosis or edema  CNS:              Intact sensory and motor function.    MEDICATIONS  (STANDING):  gabapentin 600milliGRAM(s) Oral three times a day  LORazepam     Tablet 0.5milliGRAM(s) Oral three times a day  amLODIPine   Tablet 10milliGRAM(s) Oral daily  hydrochlorothiazide   Tablet 25milliGRAM(s) Oral daily  sucralfate suspension 1Gram(s) Oral three times a day  levothyroxine 200MICROGram(s) Oral daily  sodium chloride 0.45%. 1000milliLiter(s) IV Continuous <Continuous>  influenza   Vaccine 0.5milliLiter(s) IntraMuscular once  heparin  Infusion. Unit(s)/Hr IV Continuous <Continuous>  potassium chloride   Powder 20milliEquivalent(s) Oral two times a day  potassium acid phosphate/sodium acid phosphate tablet (K-PHOS No. 2) 1Tablet(s) Oral four times a day with meals    MEDICATIONS  (PRN):  heparin  Injectable 6500Unit(s) IV Push every 6 hours PRN For aPTT less than 40  heparin  Injectable 3000Unit(s) IV Push every 6 hours PRN For aPTT between 40 - 57  oxyCODONE  5 mG/acetaminophen 325 mG 1Tablet(s) Oral every 6 hours PRN Severe Pain (7 - 10)  acetaminophen   Tablet 650milliGRAM(s) Oral every 6 hours PRN For Temp greater than 38 C (100.4 F)  guaiFENesin    Syrup 200milliGRAM(s) Oral every 6 hours PRN Cough    LABS:                        11.0   8.3   )-----------( 215      ( 02 Mar 2017 07:30 )             32.1     02 Mar 2017 07:30    138    |  102    |  11     ----------------------------<  88     3.3     |  28     |  0.41     Ca    7.1        02 Mar 2017 07:30  Phos  2.4       02 Mar 2017 07:30  Mg     1.6       02 Mar 2017 07:30    TPro  6.7    /  Alb  2.3    /  TBili  0.6    /  DBili  x      /  AST  37     /  ALT  21     /  AlkPhos  197    28 Feb 2017 22:37    PT/INR - ( 28 Feb 2017 22:37 )   PT: 14.8 sec;   INR: 1.32 ratio         PTT - ( 02 Mar 2017 10:40 )  PTT:52.7 sec  ASSESSMENT AND PLAN:  ·	Right lung mass.  ·	Left leg DVT ( greater saphenous popliteal, femoral)  ·	Pleural effusion.  ·	Gastric CA history.  ·	Thyroid CA history.  ·	HTN.  ·	Anemia.    On full anticoagulation.  For IVC filter, then lung biopsy.  Replace potassium and follow electrolytes.

## 2017-03-02 NOTE — PROGRESS NOTE ADULT - PROBLEM SELECTOR PLAN 1
s/p thyroidectomy, hypothyroid  continue increased dose of levothyroxine 200mcg daily  check neck sono and thyroglobulin and thyroglobulin ab

## 2017-03-02 NOTE — PROGRESS NOTE ADULT - ASSESSMENT
61 year old woman with PMH HTN, history of stomach and thyroid cancer (neither active as per family) spinal lesion (unclear etiology, followed q 1 month with MRI as per family) and b/l LE weakness/paraplegia on going for 1 year, presents with leg swelling and + LLE DVT ,  As per d/w Dr. 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

## 2017-03-02 NOTE — PROGRESS NOTE ADULT - SUBJECTIVE AND OBJECTIVE BOX
Patient feels well	    Vital Signs Last 24 Hrs  T(C): 36.2, Max: 37.7 (03-02 @ 01:20)  T(F): 97.2, Max: 99.8 (03-02 @ 01:20)  HR: 93 (93 - 107)  BP: 117/- (97/57 - 134/77)  BP(mean): 70 (70 - 70)  RR: 20 (17 - 20)  SpO2: 97% (95% - 97%)    PHYSICAL EXAM:    general - AAO x 3  HEENT - No Icterus  CVS - RRR  RS - AE B/L  Abd - soft, NT  Ext - Pulses +        LABS:                        11.0   8.3   )-----------( 215      ( 02 Mar 2017 07:30 )             32.1     02 Mar 2017 07:30    138    |  102    |  11     ----------------------------<  88     3.3     |  28     |  0.41     Ca    7.1        02 Mar 2017 07:30  Phos  2.4       02 Mar 2017 07:30  Mg     1.6       02 Mar 2017 07:30    TPro  6.7    /  Alb  2.3    /  TBili  0.6    /  DBili  x      /  AST  37     /  ALT  21     /  AlkPhos  197    28 Feb 2017 22:37    PT/INR - ( 28 Feb 2017 22:37 )   PT: 14.8 sec;   INR: 1.32 ratio         PTT - ( 02 Mar 2017 00:35 )  PTT:81.0 sec      RADIOLOGY & ADDITIONAL STUDIES:

## 2017-03-02 NOTE — PROGRESS NOTE ADULT - SUBJECTIVE AND OBJECTIVE BOX
Patient is a 61y old  Female who presents with a chief complaint of Leg swelling (01 Mar 2017 00:23)      INTERVAL HPI/OVERNIGHT EVENTS:  no events overnight, no complaints  has not had neck sono in years    MEDICATIONS  (STANDING):  gabapentin 600milliGRAM(s) Oral three times a day  LORazepam     Tablet 0.5milliGRAM(s) Oral three times a day  amLODIPine   Tablet 10milliGRAM(s) Oral daily  hydrochlorothiazide   Tablet 25milliGRAM(s) Oral daily  sucralfate suspension 1Gram(s) Oral three times a day  levothyroxine 200MICROGram(s) Oral daily  sodium chloride 0.45%. 1000milliLiter(s) IV Continuous <Continuous>  influenza   Vaccine 0.5milliLiter(s) IntraMuscular once  heparin  Infusion. Unit(s)/Hr IV Continuous <Continuous>  potassium chloride   Powder 20milliEquivalent(s) Oral two times a day  magnesium oxide 400milliGRAM(s) Oral three times a day with meals  potassium acid phosphate/sodium acid phosphate tablet (K-PHOS No. 2) 1Tablet(s) Oral two times a day with meals    MEDICATIONS  (PRN):  heparin  Injectable 6500Unit(s) IV Push every 6 hours PRN For aPTT less than 40  heparin  Injectable 3000Unit(s) IV Push every 6 hours PRN For aPTT between 40 - 57  oxyCODONE  5 mG/acetaminophen 325 mG 1Tablet(s) Oral every 6 hours PRN Severe Pain (7 - 10)  acetaminophen   Tablet 650milliGRAM(s) Oral every 6 hours PRN For Temp greater than 38 C (100.4 F)  guaiFENesin    Syrup 200milliGRAM(s) Oral every 6 hours PRN Cough      REVIEW OF SYSTEMS:  CONSTITUTIONAL: No fever, weight loss, or fatigue  RESPIRATORY: No cough, wheezing, chills or hemoptysis; No shortness of breath  CARDIOVASCULAR: No chest pain, palpitations, dizziness, or leg swelling  GASTROINTESTINAL: No abdominal or epigastric pain. No nausea, vomiting, or hematemesis; No diarrhea or constipation. No melena or hematochezia.  loss      Vital Signs Last 24 Hrs  T(C): 36.2, Max: 37.7 (03-02 @ 01:20)  T(F): 97.2, Max: 99.8 (03-02 @ 01:20)  HR: 93 (93 - 107)  BP: 117/- (97/57 - 134/77)  BP(mean): 70 (70 - 70)  RR: 20 (17 - 20)  SpO2: 97% (95% - 97%)    PHYSICAL EXAM:  GENERAL: NAD, well-groomed, well-developed  CHEST/LUNG: Clear to percussion bilaterally; No rales, rhonchi, wheezing, or rubs  HEART: Regular rate and rhythm; No murmurs, rubs, or gallops        LABS:                        11.0   8.3   )-----------( 215      ( 02 Mar 2017 07:30 )             32.1     02 Mar 2017 07:30    138    |  102    |  11     ----------------------------<  88     3.3     |  28     |  0.41     Ca    7.1        02 Mar 2017 07:30  Phos  2.4       02 Mar 2017 07:30  Mg     1.6       02 Mar 2017 07:30    TPro  6.7    /  Alb  2.3    /  TBili  0.6    /  DBili  x      /  AST  37     /  ALT  21     /  AlkPhos  197    28 Feb 2017 22:37    PT/INR - ( 28 Feb 2017 22:37 )   PT: 14.8 sec;   INR: 1.32 ratio         PTT - ( 02 Mar 2017 00:35 )  PTT:81.0 sec    CAPILLARY BLOOD GLUCOSE    Lipid panel:   CARDIAC MARKERS ( 28 Feb 2017 22:37 )  <.015 ng/mL / x     / 23 U/L / x     / 0.5 ng/mL              RADIOLOGY & ADDITIONAL TESTS:

## 2017-03-03 ENCOUNTER — TRANSCRIPTION ENCOUNTER (OUTPATIENT)
Age: 62
End: 2017-03-03

## 2017-03-03 DIAGNOSIS — C16.9 MALIGNANT NEOPLASM OF STOMACH, UNSPECIFIED: ICD-10-CM

## 2017-03-03 LAB
ANION GAP SERPL CALC-SCNC: 10 MMOL/L — SIGNIFICANT CHANGE UP (ref 5–17)
APTT BLD: 30.4 SEC — SIGNIFICANT CHANGE UP (ref 27.5–37.4)
APTT BLD: 56.9 SEC — HIGH (ref 27.5–37.4)
BUN SERPL-MCNC: 10 MG/DL — SIGNIFICANT CHANGE UP (ref 7–23)
CALCIUM SERPL-MCNC: 7 MG/DL — LOW (ref 8.5–10.1)
CHLORIDE SERPL-SCNC: 100 MMOL/L — SIGNIFICANT CHANGE UP (ref 96–108)
CO2 SERPL-SCNC: 28 MMOL/L — SIGNIFICANT CHANGE UP (ref 22–31)
CREAT SERPL-MCNC: 0.51 MG/DL — SIGNIFICANT CHANGE UP (ref 0.5–1.3)
GLUCOSE SERPL-MCNC: 84 MG/DL — SIGNIFICANT CHANGE UP (ref 70–99)
HCT VFR BLD CALC: 32.8 % — LOW (ref 34.5–45)
HGB BLD-MCNC: 10.9 G/DL — LOW (ref 11.5–15.5)
INR BLD: 1.29 RATIO — HIGH (ref 0.88–1.16)
MAGNESIUM SERPL-MCNC: 2.2 MG/DL — SIGNIFICANT CHANGE UP (ref 1.8–2.4)
MCHC RBC-ENTMCNC: 31.5 PG — SIGNIFICANT CHANGE UP (ref 27–34)
MCHC RBC-ENTMCNC: 33.3 GM/DL — SIGNIFICANT CHANGE UP (ref 32–36)
MCV RBC AUTO: 94.5 FL — SIGNIFICANT CHANGE UP (ref 80–100)
PHOSPHATE SERPL-MCNC: 3 MG/DL — SIGNIFICANT CHANGE UP (ref 2.5–4.5)
PLATELET # BLD AUTO: 252 K/UL — SIGNIFICANT CHANGE UP (ref 150–400)
POTASSIUM SERPL-MCNC: 2.8 MMOL/L — CRITICAL LOW (ref 3.5–5.3)
POTASSIUM SERPL-SCNC: 2.8 MMOL/L — CRITICAL LOW (ref 3.5–5.3)
PROTHROM AB SERPL-ACNC: 14.5 SEC — HIGH (ref 10–13.1)
RBC # BLD: 3.47 M/UL — LOW (ref 3.8–5.2)
RBC # FLD: 13.6 % — SIGNIFICANT CHANGE UP (ref 11–15)
SODIUM SERPL-SCNC: 138 MMOL/L — SIGNIFICANT CHANGE UP (ref 135–145)
VIT B12 SERPL-MCNC: 1704 PG/ML — HIGH (ref 243–894)
WBC # BLD: 8.8 K/UL — SIGNIFICANT CHANGE UP (ref 3.8–10.5)
WBC # FLD AUTO: 8.8 K/UL — SIGNIFICANT CHANGE UP (ref 3.8–10.5)

## 2017-03-03 PROCEDURE — 71010: CPT | Mod: 26

## 2017-03-03 PROCEDURE — 72148 MRI LUMBAR SPINE W/O DYE: CPT | Mod: 26

## 2017-03-03 PROCEDURE — 88305 TISSUE EXAM BY PATHOLOGIST: CPT | Mod: 26

## 2017-03-03 PROCEDURE — 70551 MRI BRAIN STEM W/O DYE: CPT | Mod: 26

## 2017-03-03 PROCEDURE — 88112 CYTOPATH CELL ENHANCE TECH: CPT | Mod: 26

## 2017-03-03 PROCEDURE — 88104 CYTOPATH FL NONGYN SMEARS: CPT | Mod: 26,59

## 2017-03-03 PROCEDURE — 99233 SBSQ HOSP IP/OBS HIGH 50: CPT

## 2017-03-03 RX ORDER — POTASSIUM CHLORIDE 20 MEQ
10 PACKET (EA) ORAL
Qty: 0 | Refills: 0 | Status: DISCONTINUED | OUTPATIENT
Start: 2017-03-03 | End: 2017-03-03

## 2017-03-03 RX ORDER — MORPHINE SULFATE 50 MG/1
2 CAPSULE, EXTENDED RELEASE ORAL ONCE
Qty: 0 | Refills: 0 | Status: DISCONTINUED | OUTPATIENT
Start: 2017-03-03 | End: 2017-03-03

## 2017-03-03 RX ORDER — SODIUM CHLORIDE 9 MG/ML
1000 INJECTION, SOLUTION INTRAVENOUS
Qty: 0 | Refills: 0 | Status: DISCONTINUED | OUTPATIENT
Start: 2017-03-03 | End: 2017-03-05

## 2017-03-03 RX ORDER — LEVOTHYROXINE SODIUM 125 MCG
200 TABLET ORAL DAILY
Qty: 0 | Refills: 0 | Status: DISCONTINUED | OUTPATIENT
Start: 2017-03-03 | End: 2017-03-08

## 2017-03-03 RX ORDER — SUCRALFATE 1 G
1 TABLET ORAL THREE TIMES A DAY
Qty: 0 | Refills: 0 | Status: DISCONTINUED | OUTPATIENT
Start: 2017-03-03 | End: 2017-03-06

## 2017-03-03 RX ORDER — AMLODIPINE BESYLATE 2.5 MG/1
10 TABLET ORAL DAILY
Qty: 0 | Refills: 0 | Status: DISCONTINUED | OUTPATIENT
Start: 2017-03-03 | End: 2017-03-08

## 2017-03-03 RX ORDER — ACETAMINOPHEN 500 MG
650 TABLET ORAL EVERY 6 HOURS
Qty: 0 | Refills: 0 | Status: DISCONTINUED | OUTPATIENT
Start: 2017-03-03 | End: 2017-03-08

## 2017-03-03 RX ORDER — ALBUTEROL 90 UG/1
2.5 AEROSOL, METERED ORAL EVERY 6 HOURS
Qty: 0 | Refills: 0 | Status: DISCONTINUED | OUTPATIENT
Start: 2017-03-03 | End: 2017-03-03

## 2017-03-03 RX ORDER — GABAPENTIN 400 MG/1
600 CAPSULE ORAL THREE TIMES A DAY
Qty: 0 | Refills: 0 | Status: DISCONTINUED | OUTPATIENT
Start: 2017-03-03 | End: 2017-03-08

## 2017-03-03 RX ORDER — IPRATROPIUM/ALBUTEROL SULFATE 18-103MCG
3 AEROSOL WITH ADAPTER (GRAM) INHALATION ONCE
Qty: 0 | Refills: 0 | Status: COMPLETED | OUTPATIENT
Start: 2017-03-03 | End: 2017-03-03

## 2017-03-03 RX ORDER — ENOXAPARIN SODIUM 100 MG/ML
30 INJECTION SUBCUTANEOUS
Qty: 0 | Refills: 0 | Status: DISCONTINUED | OUTPATIENT
Start: 2017-03-04 | End: 2017-03-05

## 2017-03-03 RX ORDER — RIVAROXABAN 15 MG-20MG
10 KIT ORAL DAILY
Qty: 0 | Refills: 0 | Status: DISCONTINUED | OUTPATIENT
Start: 2017-03-04 | End: 2017-03-05

## 2017-03-03 RX ADMIN — GABAPENTIN 600 MILLIGRAM(S): 400 CAPSULE ORAL at 15:39

## 2017-03-03 RX ADMIN — Medication 3 MILLILITER(S): at 13:14

## 2017-03-03 RX ADMIN — Medication 1 GRAM(S): at 21:25

## 2017-03-03 RX ADMIN — Medication 0.5 MILLIGRAM(S): at 21:26

## 2017-03-03 RX ADMIN — Medication 1 GRAM(S): at 15:39

## 2017-03-03 RX ADMIN — GABAPENTIN 600 MILLIGRAM(S): 400 CAPSULE ORAL at 21:26

## 2017-03-03 RX ADMIN — Medication 100 MILLIEQUIVALENT(S): at 09:35

## 2017-03-03 RX ADMIN — SODIUM CHLORIDE 75 MILLILITER(S): 9 INJECTION, SOLUTION INTRAVENOUS at 13:15

## 2017-03-03 RX ADMIN — MORPHINE SULFATE 2 MILLIGRAM(S): 50 CAPSULE, EXTENDED RELEASE ORAL at 18:11

## 2017-03-03 RX ADMIN — Medication 0.5 MILLIGRAM(S): at 15:39

## 2017-03-03 NOTE — PROGRESS NOTE ADULT - SUBJECTIVE AND OBJECTIVE BOX
CHIEF COMPLAINT/INTERVAL HISTORY:    Patient is a 61y old  Female who presents with a chief complaint of Leg swelling (01 Mar 2017 00:23)      HPI:  61 year old woman with PMH HTN, history of stomach and thyroid cancer (neither active as per family) spinal lesion (unclear etiology, followed q 1 month with MRI as per family) and paraplegia presents with complaint of b/l leg swelling for unknown amount of time.  Family states that they noticed the patient's feet were swollen for the last few weeks and advised her to see her doctor but the patient had trouble leaving her home due to her paralysis.  She also complains of cough with pleuritic chest pain x 1 week.  She denies fever, chills, wheezing, SOB.      LE US positive for b/l DVT. (01 Mar 2017 00:23)      SUBJECTIVE & OBJECTIVE: Pt seen and examined at bedside in morning.  Pt. anxious,   NPO for IVC filter and bronchoscopy with biopsy today.  Denies chest pain/SOB, nausea/vomiting/diarrhea, No cough, dizziness, HA or blurry vision, all other ROS negative.  heparin gtt on hold for procedure  Family by the bed side.    Vital Signs Last 24 Hrs  T(C): 36.6, Max: 37.8 (03-03 @ 08:22)  T(F): 97.8, Max: 100 (03-03 @ 08:22)  HR: 103 (92 - 109)  BP: 131/71 (99/58 - 160/66)  BP(mean): --  RR: 17 (16 - 218)  SpO2: 98% (95% - 100%)    MEDICATIONS  (STANDING):  gabapentin 600milliGRAM(s) Oral three times a day  LORazepam     Tablet 0.5milliGRAM(s) Oral three times a day  amLODIPine   Tablet 10milliGRAM(s) Oral daily  hydrochlorothiazide   Tablet 25milliGRAM(s) Oral daily  sucralfate suspension 1Gram(s) Oral three times a day  levothyroxine 200MICROGram(s) Oral daily  sodium chloride 0.45%. 1000milliLiter(s) IV Continuous <Continuous>  influenza   Vaccine 0.5milliLiter(s) IntraMuscular once  heparin  Infusion. Unit(s)/Hr IV Continuous <Continuous>  potassium chloride   Powder 20milliEquivalent(s) Oral two times a day  potassium acid phosphate/sodium acid phosphate tablet (K-PHOS No. 2) 1Tablet(s) Oral four times a day with meals    MEDICATIONS  (PRN):  heparin  Injectable 6500Unit(s) IV Push every 6 hours PRN For aPTT less than 40  heparin  Injectable 3000Unit(s) IV Push every 6 hours PRN For aPTT between 40 - 57  oxyCODONE  5 mG/acetaminophen 325 mG 1Tablet(s) Oral every 6 hours PRN Severe Pain (7 - 10)  acetaminophen   Tablet 650milliGRAM(s) Oral every 6 hours PRN For Temp greater than 38 C (100.4 F)  guaiFENesin    Syrup 200milliGRAM(s) Oral every 6 hours PRN Cough        PHYSICAL EXAM:    GENERAL: NAD,  well-developed  HEAD:  Atraumatic, Normocephalic  EYES: EOMI, PERRLA, conjunctiva and sclera clear, + pallor  ENMT: Moist mucous membranes  NECK: Supple, No JVD  NERVOUS SYSTEM:  Alert & Oriented X3,   CHEST/LUNG: Decreased BS on right side, No rales, rhonchi, wheezing, or rubs  HEART: Regular rate and rhythm; No murmurs, rubs, or gallops  ABDOMEN: Soft, Nontender, Nondistended; Bowel sounds present  EXTREMITIES:  no cyanosis, B/L LE pedal Edema, Left calf swelling, dimension of LLE greater than RLE    LABS:                          10.9   8.8   )-----------( 252      ( 03 Mar 2017 07:00 )             32.8   03 Mar 2017 07:00    138    |  100    |  10     ----------------------------<  84     2.8     |  28     |  0.51     Ca    7.0        03 Mar 2017 07:00  Phos  3.0       03 Mar 2017 07:00  Mg     2.2       03 Mar 2017 07:00    PT/INR - ( 03 Mar 2017 07:00 )   PT: 14.5 sec;   INR: 1.29 ratio         PTT - ( 03 Mar 2017 10:31 )  PTT:30.4 sec                          PTT - ( 02 Mar 2017 10:40 )  PTT:52.7 sec      CT chest:    IMPRESSION:      Mild upper lobe predominant centrilobular emphysema. Pleural-parenchymal   scarring at the right lung apex. Abnormal soft tissue in the right hilum   measuring 3.3 x 3.1 cm with associated narrowing of the segmental and   subsegmental branches of the right pulmonary artery branches. No   left-sided pulmonary embolus. There is a 4.8 x 4.7 cm area of cavitation   in the right lower lobe. Findings suspicious for neoplasm. PET/CT   suggested for further evaluation.    Small right-sided pleural effusion. Obstruction of the right middle lobe   bronchus with associated partial atelectasis of the right middle lobe.    Indeterminate 2.5 x 1.9 cm left adrenal nodule. MRI can be obtained for   further evaluation.    Fluid noted within the mid and distal esophagus, correlate for aspiration   risk.    Diffuse hepatic steatosis.      U/S neck soft tissue  IMPRESSION:  Questionable 2 cm lesion in the left thyroid fossa which is of uncertain   etiology; recommend 6 month follow-up ultrasound or CT.    V. doppler LLE  DVT in LLE

## 2017-03-03 NOTE — PROGRESS NOTE ADULT - PROBLEM SELECTOR PLAN 1
s/p thyroidectomy, hypothyroid  continue increased dose of levothyroxine 200mcg daily  check thyroglobulin and thyroglobulin ab  neck sono showed 0.5x9p3ni left thyroid bed lesion (daughter to get prior sono results for comparison), if new +/- elevated Tg would consider FNA, can be done as outpt

## 2017-03-03 NOTE — PROGRESS NOTE ADULT - PROBLEM SELECTOR PLAN 1
Newly Dx lung mass, suspected malignancy  Pulmonary and CT surgeon following  IR evaluation appreciated  for lung biopsy on Monday By Dr. Monroe Newly Dx lung mass, suspected malignancy  Pulmonary and CT surgeon following  IR evaluation appreciated  for lung biopsy on Monday By Dr. Monroe  As per CT surgeon Lung Bx today via bronchoscopy.

## 2017-03-03 NOTE — PROGRESS NOTE ADULT - SUBJECTIVE AND OBJECTIVE BOX
Post-op check    S/P bronch with biopsy and IVC F POD#0  61y year old Female seen and examined at bedside. Admits to pain well controlled with medication. Denies chest pain, shortness of breath, nausea/ vomiting, and dizziness.     Vital Signs Last 24 Hrs  T(F): 97.8, Max: 100 (03-03 @ 08:22)  HR: 93  BP: 119/68  RR: 16  SpO2: 98%  Wt(kg): --   CAPILLARY BLOOD GLUCOSE      GENERAL: Alert, NAD  CHEST/LUNG: Clear to auscultation bilaterally, respirations nonlabored  HEART: S1S2, Regular rate and rhythm  ABDOMEN: Non distended + Bowel sounds, soft, Appropriate incisional tenderness, Nondistended  EXTREMITIES:  Left leg severely edematous, right leg non edematous. no calf tenderness.    61y old  Female s/p secondary to ACUTE DEEP VEIN THROMBOSIS LOWER EXTREMITY  PMH GERD (gastroesophageal reflux disease)  Thyroid cancer  Stomach cancer  HTN (hypertension)      - DVT prophylaxis, Incentive Spirometer, OOB, Ambulating, pain control  - Continue antibiotics   - f/u labs   - continue current management per medicine  - will discuss with surgical attending

## 2017-03-03 NOTE — PROGRESS NOTE ADULT - SUBJECTIVE AND OBJECTIVE BOX
INTERVAL HISTORY:  Cough.  DVT  Right Lung Lesion.      MEDICATIONS  (STANDING):  sodium chloride 0.45%. 1000milliLiter(s) IV Continuous <Continuous>  influenza   Vaccine 0.5milliLiter(s) IntraMuscular once    MEDICATIONS  (PRN):      Vital Signs Last 24 Hrs  T(C): 37, Max: 37.8 (03-03 @ 08:22)  T(F): 98.6, Max: 100 (03-03 @ 08:22)  HR: 101 (86 - 101)  BP: 130/76 (99/58 - 130/76)  BP(mean): --  RR: 18 (16 - 218)  SpO2: 95% (95% - 97%)    PHYSICAL EXAM:    GENERAL: NAD,   HEAD:  Atraumatic, Normocephalic  EYES: EOMI, PERRLA, conjunctiva and sclera clear    NECK: Supple, No JVD, Normal thyroid     CHEST/LUNG: Clear to percussion bilaterally; No rales, rhonchi,   HEART: Regular rate and rhythm;   ABDOMEN: Soft, Nontender.    LYMPH: No lymphadenopathy noted        LABS:                        10.9   8.8   )-----------( 252      ( 03 Mar 2017 07:00 )             32.8     03 Mar 2017 07:00    138    |  100    |  10     ----------------------------<  84     2.8     |  28     |  0.51     Ca    7.0        03 Mar 2017 07:00  Phos  3.0       03 Mar 2017 07:00  Mg     2.2       03 Mar 2017 07:00      PT/INR - ( 03 Mar 2017 07:00 )   PT: 14.5 sec;   INR: 1.29 ratio         PTT - ( 03 Mar 2017 10:31 )  PTT:30.4 sec        RADIOLOGY & ADDITIONAL STUDIES:    PATHOLOGY:

## 2017-03-03 NOTE — BRIEF OPERATIVE NOTE - POST-OP DX
Acute deep vein thrombosis (DVT) of femoral vein of left lower extremity  03/03/2017    Active  Avery Huffman
Acute deep vein thrombosis (DVT) of femoral vein of left lower extremity  03/03/2017    Active  Avery Huffman  Malignant neoplasm of middle lobe of right lung  03/03/2017    Active  Avery Huffman

## 2017-03-03 NOTE — PROGRESS NOTE ADULT - PROBLEM SELECTOR PLAN 7
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  Neuro eval appreciated  recommend MRI  d/w daughter to bring records/MRI results from her neuro and PCP  B12 /copper level

## 2017-03-03 NOTE — PROGRESS NOTE ADULT - SUBJECTIVE AND OBJECTIVE BOX
INTERVAL HPI/OVERNIGHT EVENTS:  61 year old woman with PMH HTN, history of stomach CA s/p subtotal gastrectomy 5 years ago, and thyroid cancer s/p surgery 17 years ago (neither active as per family)Cervical spinal lesion (unclear etiology, followed q 1 month with MRI as per family) and bilateral LE weakness with neuropathy, with limited mobility. Presented with complaint of b/l leg swelling for unknown amount of time.  Family states that they noticed the patient's feet were swollen for the last few weeks and advised her to see her doctor but the patient had trouble leaving her home due to her LE weakness.  She also complains of cough with pleuritic chest pain x 1 week.  She denies fever, chills, wheezing, SOB.    Found to have extensive right leg DVT and right lung mass.  S/P IVC filter and Bronchoscopy on 03/03/17.    Vital Signs Last 24 Hrs  T(C): 36.6, Max: 37.8 (03-03 @ 08:22)  T(F): 97.8, Max: 100 (03-03 @ 08:22)  HR: 93 (92 - 109)  BP: 119/68 (99/54 - 160/66)  BP(mean): --  RR: 16 (16 - 218)  SpO2: 98% (95% - 100%)    PHYSICAL EXAM:  GEN:        Awake, responsive and comfortable.  HEENT:    Normal.    RESP:       crackles.  CVS:          Regular rate and rhythm.   ABD:         Soft, non-tender, non-distended;     MEDICATIONS  (STANDING):  sodium chloride 0.45%. 1000milliLiter(s) IV Continuous <Continuous>  influenza   Vaccine 0.5milliLiter(s) IntraMuscular once  lactated ringers. 1000milliLiter(s) IV Continuous <Continuous>  gabapentin 600milliGRAM(s) Oral three times a day  LORazepam     Tablet 0.5milliGRAM(s) Oral three times a day  amLODIPine   Tablet 10milliGRAM(s) Oral daily  sucralfate suspension 1Gram(s) Oral three times a day  levothyroxine 200MICROGram(s) Oral daily    MEDICATIONS  (PRN):  oxyCODONE  5 mG/acetaminophen 325 mG 1Tablet(s) Oral every 6 hours PRN Severe Pain (7 - 10)  acetaminophen   Tablet 650milliGRAM(s) Oral every 6 hours PRN For Temp greater than 38 C (100.4 F)  guaiFENesin    Syrup 200milliGRAM(s) Oral every 6 hours PRN Cough  benzonatate 100milliGRAM(s) Oral three times a day PRN Cough    LABS:                        10.9   8.8   )-----------( 252      ( 03 Mar 2017 07:00 )             32.8     03 Mar 2017 07:00    138    |  100    |  10     ----------------------------<  84     2.8     |  28     |  0.51     Ca    7.0        03 Mar 2017 07:00  Phos  3.0       03 Mar 2017 07:00  Mg     2.2       03 Mar 2017 07:00      PT/INR - ( 03 Mar 2017 07:00 )   PT: 14.5 sec;   INR: 1.29 ratio         PTT - ( 03 Mar 2017 10:31 )  PTT:30.4 sec    ASSESSMENT AND PLAN:  ·	Right lung mass.  ·	Left leg DVT ( greater saphenous popliteal, femoral)  ·	Pleural effusion.  ·	Gastric CA history.  ·	Thyroid CA history.  ·	HTN.  ·	Anemia.    S/P IVC filter and Bronchoscopy.   Follow biopsy results.  May start back on anticoagulation tomorrow.  Discussed with daughter(HCP).

## 2017-03-03 NOTE — PROGRESS NOTE ADULT - SUBJECTIVE AND OBJECTIVE BOX
Patient is a 61y old  Female who presents with a chief complaint of Leg swelling (01 Mar 2017 00:23)        INTERVAL HPI/OVERNIGHT EVENTS:    C/O cough  Left leg swelling on IV heparin  No fever  no SOB        Pt planned for OR today fro bronch/bx and IVC filter  unsure when last ultrasound neck done was and findings  Subjective:    Vital Signs:  Vital Signs Last 24 Hrs  T(C): 37, Max: 37.8 (03-03 @ 08:22)  T(F): 98.6, Max: 100 (03-03 @ 08:22)  HR: 101 (86 - 101)  BP: 130/76 (99/58 - 130/76)  RR: 18 (16 - 218)  SpO2: 95% (95% - 97%)  Wt(kg): -- on (O2)    Telemetry/Alarms:    Medications  sodium chloride 0.45%. 1000milliLiter(s) IV Continuous <Continuous>  influenza   Vaccine 0.5milliLiter(s) IntraMuscular once  oxyCODONE  5 mG/acetaminophen 325 mG 1Tablet(s) Oral every 6 hours PRN  gabapentin 600milliGRAM(s) Oral three times a day  LORazepam     Tablet 0.5milliGRAM(s) Oral three times a day  amLODIPine   Tablet 10milliGRAM(s) Oral daily  sucralfate suspension 1Gram(s) Oral three times a day  levothyroxine 200MICROGram(s) Oral daily  acetaminophen   Tablet 650milliGRAM(s) Oral every 6 hours PRN  guaiFENesin    Syrup 200milliGRAM(s) Oral every 6 hours PRN  ALBUTerol   0.5% 2.5milliGRAM(s) Nebulizer every 6 hours PRN  benzonatate 100milliGRAM(s) Oral three times a day PRN      Physical Exam  General: WN/WD NAD  Neurology: A&Ox3, nonfocal, HERNANDEZ x 4  Respiratory: CTA B/L  CV: RRR, S1S2, no murmurs, rubs or gallops  Abdominal: Soft, NT, ND +BS, Last BM  Extremities: No edema, + peripheral pulses  Incisions: MSI Healing Well, Sternum Stable  Tubes/Wires:      I & Os for current day (as of 03-03 @ 12:57)  =============================================  IN:    Oral Fluid: 240 ml    heparin  Infusion.: 168 ml    Total IN: 408 ml  ---------------------------------------------  OUT:    Voided: 800 ml    Total OUT: 800 ml  ---------------------------------------------  Total NET: -392 ml      Labs  03 Mar 2017 07:00    138    |  100    |  10     ----------------------------<  84     2.8     |  28     |  0.51     Ca    7.0        03 Mar 2017 07:00  Phos  3.0       03 Mar 2017 07:00  Mg     2.2       03 Mar 2017 07:00                            10.9   8.8   )-----------( 252      ( 03 Mar 2017 07:00 )             32.8       Today's CXR:    PAST MEDICAL & SURGICAL HISTORY:  GERD (gastroesophageal reflux disease)  Thyroid cancer  Stomach cancer  HTN (hypertension)  No significant past surgical history      Assessment  61y Female admitted with complaints of Patient is a 61y old  Female who presents with a chief complaint of Leg swelling (01 Mar 2017 00:23)  .    On (Date), patient underwent .    PLAN  Neuro: Pain management,   Pulm: Encourage coughing, deep breathing and use of incentive spirometry. Wean off supplemental oxygen as able. Daily CXR.   Cardio: Monitor telemetry/alarms  GI: Tolerating diet.   Renal: Daily BMP, supplement electrolytes as needed  Vasc:Heparin on hold  Heme: Stable H/H. Trend CBC daily.   ID: Off antibiotics. Stable.  Therapy: OOB/ambulate    Bronchoscopy (fiberoptic) (rigid) with excision (bite) biopsy  IVC filter placement

## 2017-03-03 NOTE — PROGRESS NOTE ADULT - SUBJECTIVE AND OBJECTIVE BOX
Patient is a 61y old  Female who presents with a chief complaint of Leg swelling (01 Mar 2017 00:23)      INTERVAL HPI/OVERNIGHT EVENTS:  pt planned for OR today fro bronch/bx and IVC filter  unsure when last ultrasound neck done was and findings    MEDICATIONS  (STANDING):  gabapentin 600milliGRAM(s) Oral three times a day  LORazepam     Tablet 0.5milliGRAM(s) Oral three times a day  amLODIPine   Tablet 10milliGRAM(s) Oral daily  sucralfate suspension 1Gram(s) Oral three times a day  levothyroxine 200MICROGram(s) Oral daily  sodium chloride 0.45%. 1000milliLiter(s) IV Continuous <Continuous>  influenza   Vaccine 0.5milliLiter(s) IntraMuscular once  heparin  Infusion. Unit(s)/Hr IV Continuous <Continuous>  potassium acid phosphate/sodium acid phosphate tablet (K-PHOS No. 2) 1Tablet(s) Oral four times a day with meals  potassium chloride  10 mEq/100 mL IVPB 10milliEquivalent(s) IV Intermittent every 1 hour    MEDICATIONS  (PRN):  heparin  Injectable 6500Unit(s) IV Push every 6 hours PRN For aPTT less than 40  heparin  Injectable 3000Unit(s) IV Push every 6 hours PRN For aPTT between 40 - 57  oxyCODONE  5 mG/acetaminophen 325 mG 1Tablet(s) Oral every 6 hours PRN Severe Pain (7 - 10)  acetaminophen   Tablet 650milliGRAM(s) Oral every 6 hours PRN For Temp greater than 38 C (100.4 F)  guaiFENesin    Syrup 200milliGRAM(s) Oral every 6 hours PRN Cough      REVIEW OF SYSTEMS:  CARDIOVASCULAR: No chest pain, palpitations, dizziness, or leg swelling  GASTROINTESTINAL: No abdominal or epigastric pain. No nausea, vomiting, or hematemesis; No diarrhea or constipation. No melena or hematochezia.  ENDOCRINE: No heat or cold intolerance; No hair loss      Vital Signs Last 24 Hrs  T(C): 37, Max: 37.8 (03-03 @ 08:22)  T(F): 98.6, Max: 100 (03-03 @ 08:22)  HR: 101 (86 - 101)  BP: 130/76 (99/58 - 130/76)  BP(mean): --  RR: 18 (16 - 218)  SpO2: 95% (95% - 97%)    PHYSICAL EXAM:  GENERAL: NAD, well-groomed, well-developed  CHEST/LUNG: Clear to percussion bilaterally; No rales, rhonchi, wheezing, or rubs  HEART: Regular rate and rhythm; No murmurs, rubs, or gallops  ABDOMEN: Soft, Nontender, Nondistended; Bowel sounds present        LABS:                        10.9   8.8   )-----------( 252      ( 03 Mar 2017 07:00 )             32.8     03 Mar 2017 07:00    138    |  100    |  10     ----------------------------<  84     2.8     |  28     |  0.51     Ca    7.0        03 Mar 2017 07:00  Phos  3.0       03 Mar 2017 07:00  Mg     2.2       03 Mar 2017 07:00      PT/INR - ( 03 Mar 2017 07:00 )   PT: 14.5 sec;   INR: 1.29 ratio         PTT - ( 03 Mar 2017 10:31 )  PTT:30.4 sec    CAPILLARY BLOOD GLUCOSE

## 2017-03-03 NOTE — BRIEF OPERATIVE NOTE - PROCEDURE
IVC filter placement  03/03/2017    Active  EMILIANA
Bronchoscopy (fiberoptic) (rigid) with brushing or washing for specimen collection  03/03/2017    Active  EMILIANA  Bronchoscopy (fiberoptic) (rigid) with excision (bite) biopsy  03/03/2017    Active  EMILIANA

## 2017-03-04 DIAGNOSIS — R53.1 WEAKNESS: ICD-10-CM

## 2017-03-04 DIAGNOSIS — R50.9 FEVER, UNSPECIFIED: ICD-10-CM

## 2017-03-04 DIAGNOSIS — E87.6 HYPOKALEMIA: ICD-10-CM

## 2017-03-04 DIAGNOSIS — C34.2 MALIGNANT NEOPLASM OF MIDDLE LOBE, BRONCHUS OR LUNG: ICD-10-CM

## 2017-03-04 LAB
ANION GAP SERPL CALC-SCNC: 7 MMOL/L — SIGNIFICANT CHANGE UP (ref 5–17)
BUN SERPL-MCNC: 11 MG/DL — SIGNIFICANT CHANGE UP (ref 7–23)
CALCIUM SERPL-MCNC: 7.3 MG/DL — LOW (ref 8.5–10.1)
CHLORIDE SERPL-SCNC: 102 MMOL/L — SIGNIFICANT CHANGE UP (ref 96–108)
CO2 SERPL-SCNC: 29 MMOL/L — SIGNIFICANT CHANGE UP (ref 22–31)
COPPER SERPL-MCNC: 53 UG/DL — LOW (ref 72–166)
CREAT SERPL-MCNC: 0.41 MG/DL — LOW (ref 0.5–1.3)
GLUCOSE SERPL-MCNC: 90 MG/DL — SIGNIFICANT CHANGE UP (ref 70–99)
HCT VFR BLD CALC: 32 % — LOW (ref 34.5–45)
HGB BLD-MCNC: 11.4 G/DL — LOW (ref 11.5–15.5)
MCHC RBC-ENTMCNC: 33.1 PG — SIGNIFICANT CHANGE UP (ref 27–34)
MCHC RBC-ENTMCNC: 35.6 GM/DL — SIGNIFICANT CHANGE UP (ref 32–36)
MCV RBC AUTO: 93 FL — SIGNIFICANT CHANGE UP (ref 80–100)
PLATELET # BLD AUTO: 245 K/UL — SIGNIFICANT CHANGE UP (ref 150–400)
POTASSIUM SERPL-MCNC: 3.1 MMOL/L — LOW (ref 3.5–5.3)
POTASSIUM SERPL-SCNC: 3.1 MMOL/L — LOW (ref 3.5–5.3)
RBC # BLD: 3.44 M/UL — LOW (ref 3.8–5.2)
RBC # FLD: 13.2 % — SIGNIFICANT CHANGE UP (ref 11–15)
RENIN PLAS-CCNC: 8.43 NG/ML/HR — HIGH (ref 0.17–5.38)
SODIUM SERPL-SCNC: 138 MMOL/L — SIGNIFICANT CHANGE UP (ref 135–145)
WBC # BLD: 8.9 K/UL — SIGNIFICANT CHANGE UP (ref 3.8–10.5)
WBC # FLD AUTO: 8.9 K/UL — SIGNIFICANT CHANGE UP (ref 3.8–10.5)

## 2017-03-04 PROCEDURE — 99233 SBSQ HOSP IP/OBS HIGH 50: CPT

## 2017-03-04 PROCEDURE — 99253 IP/OBS CNSLTJ NEW/EST LOW 45: CPT

## 2017-03-04 RX ORDER — POTASSIUM CHLORIDE 20 MEQ
40 PACKET (EA) ORAL EVERY 4 HOURS
Qty: 0 | Refills: 0 | Status: COMPLETED | OUTPATIENT
Start: 2017-03-04 | End: 2017-03-04

## 2017-03-04 RX ORDER — POTASSIUM CHLORIDE 20 MEQ
40 PACKET (EA) ORAL EVERY 4 HOURS
Qty: 0 | Refills: 0 | Status: DISCONTINUED | OUTPATIENT
Start: 2017-03-04 | End: 2017-03-04

## 2017-03-04 RX ORDER — PIPERACILLIN AND TAZOBACTAM 4; .5 G/20ML; G/20ML
3.38 INJECTION, POWDER, LYOPHILIZED, FOR SOLUTION INTRAVENOUS ONCE
Qty: 0 | Refills: 0 | Status: COMPLETED | OUTPATIENT
Start: 2017-03-04 | End: 2017-03-04

## 2017-03-04 RX ORDER — PIPERACILLIN AND TAZOBACTAM 4; .5 G/20ML; G/20ML
3.38 INJECTION, POWDER, LYOPHILIZED, FOR SOLUTION INTRAVENOUS EVERY 8 HOURS
Qty: 0 | Refills: 0 | Status: DISCONTINUED | OUTPATIENT
Start: 2017-03-04 | End: 2017-03-06

## 2017-03-04 RX ADMIN — Medication 650 MILLIGRAM(S): at 10:43

## 2017-03-04 RX ADMIN — Medication 40 MILLIEQUIVALENT(S): at 23:35

## 2017-03-04 RX ADMIN — SODIUM CHLORIDE 75 MILLILITER(S): 9 INJECTION, SOLUTION INTRAVENOUS at 06:40

## 2017-03-04 RX ADMIN — Medication 0.5 MILLIGRAM(S): at 06:39

## 2017-03-04 RX ADMIN — PIPERACILLIN AND TAZOBACTAM 200 GRAM(S): 4; .5 INJECTION, POWDER, LYOPHILIZED, FOR SOLUTION INTRAVENOUS at 21:36

## 2017-03-04 RX ADMIN — AMLODIPINE BESYLATE 10 MILLIGRAM(S): 2.5 TABLET ORAL at 06:39

## 2017-03-04 RX ADMIN — GABAPENTIN 600 MILLIGRAM(S): 400 CAPSULE ORAL at 21:36

## 2017-03-04 RX ADMIN — Medication 200 MICROGRAM(S): at 06:39

## 2017-03-04 RX ADMIN — Medication 1 GRAM(S): at 06:42

## 2017-03-04 RX ADMIN — Medication 0.5 MILLIGRAM(S): at 14:24

## 2017-03-04 RX ADMIN — ENOXAPARIN SODIUM 30 MILLIGRAM(S): 100 INJECTION SUBCUTANEOUS at 06:40

## 2017-03-04 RX ADMIN — Medication 40 MILLIEQUIVALENT(S): at 17:46

## 2017-03-04 RX ADMIN — GABAPENTIN 600 MILLIGRAM(S): 400 CAPSULE ORAL at 06:39

## 2017-03-04 RX ADMIN — Medication 1 GRAM(S): at 21:37

## 2017-03-04 RX ADMIN — Medication 0.5 MILLIGRAM(S): at 21:37

## 2017-03-04 RX ADMIN — Medication 1 GRAM(S): at 14:24

## 2017-03-04 RX ADMIN — SODIUM CHLORIDE 75 MILLILITER(S): 9 INJECTION, SOLUTION INTRAVENOUS at 21:35

## 2017-03-04 RX ADMIN — ENOXAPARIN SODIUM 30 MILLIGRAM(S): 100 INJECTION SUBCUTANEOUS at 17:47

## 2017-03-04 RX ADMIN — GABAPENTIN 600 MILLIGRAM(S): 400 CAPSULE ORAL at 14:24

## 2017-03-04 RX ADMIN — RIVAROXABAN 10 MILLIGRAM(S): KIT at 11:01

## 2017-03-04 NOTE — CONSULT NOTE ADULT - SUBJECTIVE AND OBJECTIVE BOX
Infectious Diseases - Attending at Dr. Figueroa    HPI:  61 year old woman with PMH HTN, history of stomach and thyroid cancer (neither active as per family) spinal lesion (unclear etiology, followed q 1 month with MRI as per family) and paraplegia presents with complaint of b/l leg swelling for unknown amount of time.  Family states that they noticed the patient's feet were swollen for the last few weeks and advised her to see her doctor but the patient had trouble leaving her home due to her paralysis.  She also complains of cough with pleuritic chest pain x 1 week.  She denies fever, chills, wheezing, SOB.      LE US positive for b/l DVT. (01 Mar 2017 00:23)  Pt was found to have RLL cavitary lesion possible tumor ,underwent bronchoscopy with biopsy. Results pending. Pt was found to have fever in last 2-3 days low grade. admits to occasional temp at [facility] as well ,max around 100.She also underwent IVC filter placement for DVT yesterday. T max of 101.8 today. Mild shortenss of breath, no cough, no chest pain.      PAST MEDICAL & SURGICAL HISTORY:  GERD (gastroesophageal reflux disease)  Thyroid cancer  Stomach cancer  HTN (hypertension)  No significant past surgical history      Allergies    itchy throat  KIWI (Other)  No Known Drug Allergies    Intolerances        FAMILY HISTORY:  No pertinent family history in first degree relatives      SOCIAL HISTORY: Quit smoking    REVIEW OF SYSTEMS:    Constitutional: + fever, weight loss or fatigue  Eyes: No eye pain, visual disturbances, or discharge  ENT:  No difficulty hearing, tinnitus, vertigo; No sinus or throat pain  Neck: No pain or stiffness  Respiratory: No cough, wheezing, chills or hemoptysis  Cardiovascular: No chest pain, palpitations,+ shortness of breath, dizziness or leg swelling  Gastrointestinal:stomach cancer, No abdominal or epigastric pain. No nausea, vomiting or hematemesis; No diarrhea or constipation. No melena or hematochezia.  Genitourinary: No dysuria, frequency, hematuria or incontinence  Rectal: No pain, hemorrhoids or incontinence  Neurological: No headaches, memory loss, loss of strength, numbness or tremors  Skin: No itching, burning, rashes or lesions   Lymph Nodes: No enlarged glands  Endocrine: Thyroid cancer, s/p surgery  Musculoskeletal: No joint pain or swelling; No muscle, back or extremity pain      MEDICATIONS  (STANDING):  influenza   Vaccine 0.5milliLiter(s) IntraMuscular once  lactated ringers. 1000milliLiter(s) IV Continuous <Continuous>  gabapentin 600milliGRAM(s) Oral three times a day  LORazepam     Tablet 0.5milliGRAM(s) Oral three times a day  amLODIPine   Tablet 10milliGRAM(s) Oral daily  sucralfate suspension 1Gram(s) Oral three times a day  levothyroxine 200MICROGram(s) Oral daily  enoxaparin Injectable 30milliGRAM(s) SubCutaneous two times a day  rivaroxaban 10milliGRAM(s) Oral daily  potassium chloride   Powder 40milliEquivalent(s) Oral every 4 hours  piperacillin/tazobactam IVPB. 3.375Gram(s) IV Intermittent once  piperacillin/tazobactam IVPB. 3.375Gram(s) IV Intermittent every 8 hours    MEDICATIONS  (PRN):  oxyCODONE  5 mG/acetaminophen 325 mG 1Tablet(s) Oral every 6 hours PRN Severe Pain (7 - 10)  acetaminophen   Tablet 650milliGRAM(s) Oral every 6 hours PRN For Temp greater than 38 C (100.4 F)  guaiFENesin    Syrup 200milliGRAM(s) Oral every 6 hours PRN Cough  benzonatate 100milliGRAM(s) Oral three times a day PRN Cough      Vital Signs Last 24 Hrs  T(C): 37.8, Max: 38.8 (03-04 @ 09:10)  T(F): 100, Max: 101.8 (03-04 @ 09:10)  HR: 72 (72 - 97)  BP: 112/61 (97/55 - 119/75)  BP(mean): --  RR: 18 (16 - 20)  SpO2: 94% (94% - 97%)    PHYSICAL EXAM:    Constitutional: NAD, well-groomed, well-developed  HEENT: PERRLA, EOMI, Normal Hearing, MMM  Neck: No LAD, No JVD  Back: Normal spine flexure, No CVA tenderness  Respiratory: CTAB/L  Cardiovascular: S1 and S2, RRR, no M/G/R  Gastrointestinal: BS+, soft, NT/ND  Extremities: No peripheral edema  Vascular: 2+ peripheral pulses  Neurological: A/O x 3, no focal deficits  Skin: No rashes      LABS:                        11.4   8.9   )-----------( 245      ( 04 Mar 2017 06:35 )             32.0     04 Mar 2017 12:46    138    |  102    |  11     ----------------------------<  90     3.1     |  29     |  0.41     Ca    7.3        04 Mar 2017 12:46  Phos  3.0       03 Mar 2017 07:00    Mg     2.2       03 Mar 2017 07:00      PT/INR - ( 03 Mar 2017 07:00 )   PT: 14.5 sec;   INR: 1.29 ratio         PTT - ( 03 Mar 2017 10:31 )  PTT:30.4 sec      MICROBIOLOGY:  RECENT CULTURES:        RADIOLOGY & ADDITIONAL STUDIES:    CT chest    IMPRESSION:      Mild upper lobe predominant centrilobular emphysema. Pleural-parenchymal   scarring at the right lung apex. Abnormal soft tissue in the right hilum   measuring 3.3 x 3.1 cm with associated narrowing of the segmental and   subsegmental branches of the right pulmonary artery branches. No   left-sided pulmonary embolus. There is a 4.8 x 4.7 cm area of cavitation   in the right lower lobe. Findings suspicious for neoplasm. PET/CT   suggested for further evaluation.    Small right-sided pleural effusion. Obstruction of the right middle lobe   bronchus with associated partial atelectasis of the right middle lobe.    Indeterminate 2.5 x 1.9 cm left adrenal nodule. MRI can be obtained for   further evaluation.    Fluid noted within the mid and distal esophagus, correlate for aspiration   risk.    Diffuse hepatic steatosis.

## 2017-03-04 NOTE — PROGRESS NOTE ADULT - SUBJECTIVE AND OBJECTIVE BOX
INTERVAL HPI/OVERNIGHT EVENTS:  Subjective Complaints:     RECENT RADIOLOGY & ADDITIONAL TESTS:    NEUROLOGICAL EXAM (Pertinent):  Vital Signs Last 24 Hrs  T(C): 37.8, Max: 38.8 (03-04 @ 09:10)  T(F): 100, Max: 101.8 (03-04 @ 09:10)  HR: 72 (72 - 97)  BP: 112/61 (97/55 - 119/75)  BP(mean): --  RR: 18 (16 - 20)  SpO2: 94% (94% - 96%)    Mental State:    Cranial Nerves:    Motor and Sensory:      Reflexes:      Cerebellar Testing:    Neurovascular:     MEDICATIONS  (STANDING):  influenza   Vaccine 0.5milliLiter(s) IntraMuscular once  lactated ringers. 1000milliLiter(s) IV Continuous <Continuous>  gabapentin 600milliGRAM(s) Oral three times a day  LORazepam     Tablet 0.5milliGRAM(s) Oral three times a day  amLODIPine   Tablet 10milliGRAM(s) Oral daily  sucralfate suspension 1Gram(s) Oral three times a day  levothyroxine 200MICROGram(s) Oral daily  enoxaparin Injectable 30milliGRAM(s) SubCutaneous two times a day  rivaroxaban 10milliGRAM(s) Oral daily  potassium chloride   Powder 40milliEquivalent(s) Oral every 4 hours  piperacillin/tazobactam IVPB. 3.375Gram(s) IV Intermittent every 8 hours    MEDICATIONS  (PRN):  oxyCODONE  5 mG/acetaminophen 325 mG 1Tablet(s) Oral every 6 hours PRN Severe Pain (7 - 10)  acetaminophen   Tablet 650milliGRAM(s) Oral every 6 hours PRN For Temp greater than 38 C (100.4 F)  guaiFENesin    Syrup 200milliGRAM(s) Oral every 6 hours PRN Cough  benzonatate 100milliGRAM(s) Oral three times a day PRN Cough    LABS:                        11.4   8.9   )-----------( 245      ( 04 Mar 2017 06:35 )             32.0     04 Mar 2017 12:46    138    |  102    |  11     ----------------------------<  90     3.1     |  29     |  0.41     Ca    7.3        04 Mar 2017 12:46  Phos  3.0       03 Mar 2017 07:00  Mg     2.2       03 Mar 2017 07:00      PT/INR - ( 03 Mar 2017 07:00 )   PT: 14.5 sec;   INR: 1.29 ratio         PTT - ( 03 Mar 2017 10:31 )  PTT:30.4 sec      ASSESSMENT & OPINION:  RECOMMENDATIONS: INTERVAL HPI/OVERNIGHT EVENTS:  Subjective Complaints:  Lethargy.  Alert and fully oriented.  Speech clear.  Left leg swollen and warm.    RECENT RADIOLOGY & ADDITIONAL TESTS:    NEUROLOGICAL EXAM (Pertinent):  Vital Signs Last 24 Hrs  T(C): 37.8, Max: 38.8 (03-04 @ 09:10)  T(F): 100, Max: 101.8 (03-04 @ 09:10)  HR: 72 (72 - 97)  BP: 112/61 (97/55 - 119/75)  BP(mean): --  RR: 18 (16 - 20)  SpO2: 94% (94% - 96%)      MEDICATIONS  (STANDING):  influenza   Vaccine 0.5milliLiter(s) IntraMuscular once  lactated ringers. 1000milliLiter(s) IV Continuous <Continuous>  gabapentin 600milliGRAM(s) Oral three times a day  LORazepam     Tablet 0.5milliGRAM(s) Oral three times a day  amLODIPine   Tablet 10milliGRAM(s) Oral daily  sucralfate suspension 1Gram(s) Oral three times a day  levothyroxine 200MICROGram(s) Oral daily  enoxaparin Injectable 30milliGRAM(s) SubCutaneous two times a day  rivaroxaban 10milliGRAM(s) Oral daily  potassium chloride   Powder 40milliEquivalent(s) Oral every 4 hours  piperacillin/tazobactam IVPB. 3.375Gram(s) IV Intermittent every 8 hours    MEDICATIONS  (PRN):  oxyCODONE  5 mG/acetaminophen 325 mG 1Tablet(s) Oral every 6 hours PRN Severe Pain (7 - 10)  acetaminophen   Tablet 650milliGRAM(s) Oral every 6 hours PRN For Temp greater than 38 C (100.4 F)  guaiFENesin    Syrup 200milliGRAM(s) Oral every 6 hours PRN Cough  benzonatate 100milliGRAM(s) Oral three times a day PRN Cough    LABS:                        11.4   8.9   )-----------( 245      ( 04 Mar 2017 06:35 )             32.0     04 Mar 2017 12:46    138    |  102    |  11     ----------------------------<  90     3.1     |  29     |  0.41     Ca    7.3        04 Mar 2017 12:46  Phos  3.0       03 Mar 2017 07:00  Mg     2.2       03 Mar 2017 07:00      PT/INR - ( 03 Mar 2017 07:00 )   PT: 14.5 sec;   INR: 1.29 ratio         PTT - ( 03 Mar 2017 10:31 )  PTT:30.4 sec    Assessment & Opinion:  By history, Lumbosacral spine derangement.  Paraplegia, etiology to be further investigated.  Suspicion for pulmonary lesion.  Paraneoplastic polyneuropathy.  Left leg DVT.    REcommendations:  Continue current management and medications.

## 2017-03-04 NOTE — PROGRESS NOTE ADULT - SUBJECTIVE AND OBJECTIVE BOX
Patient is a 61y old  Female who presents with a chief complaint of Leg swelling (01 Mar 2017 00:23)      INTERVAL HPI/OVERNIGHT EVENTS:  no complaints    MEDICATIONS  (STANDING):  sodium chloride 0.45%. 1000milliLiter(s) IV Continuous <Continuous>  influenza   Vaccine 0.5milliLiter(s) IntraMuscular once  lactated ringers. 1000milliLiter(s) IV Continuous <Continuous>  gabapentin 600milliGRAM(s) Oral three times a day  LORazepam     Tablet 0.5milliGRAM(s) Oral three times a day  amLODIPine   Tablet 10milliGRAM(s) Oral daily  sucralfate suspension 1Gram(s) Oral three times a day  levothyroxine 200MICROGram(s) Oral daily  enoxaparin Injectable 30milliGRAM(s) SubCutaneous two times a day  rivaroxaban 10milliGRAM(s) Oral daily    MEDICATIONS  (PRN):  oxyCODONE  5 mG/acetaminophen 325 mG 1Tablet(s) Oral every 6 hours PRN Severe Pain (7 - 10)  acetaminophen   Tablet 650milliGRAM(s) Oral every 6 hours PRN For Temp greater than 38 C (100.4 F)  guaiFENesin    Syrup 200milliGRAM(s) Oral every 6 hours PRN Cough  benzonatate 100milliGRAM(s) Oral three times a day PRN Cough      REVIEW OF SYSTEMS:  CONSTITUTIONAL: No fever, weight loss, or fatigue  RESPIRATORY: No cough, wheezing, chills or hemoptysis; No shortness of breath  CARDIOVASCULAR: No chest pain, palpitations, dizziness, or leg swelling        Vital Signs Last 24 Hrs  T(C): 38.8, Max: 38.8 (03-04 @ 09:10)  T(F): 101.8, Max: 101.8 (03-04 @ 09:10)  HR: 97 (92 - 109)  BP: 112/58 (97/55 - 160/66)  BP(mean): --  RR: 20 (16 - 22)  SpO2: 94% (94% - 100%)    PHYSICAL EXAM:  GENERAL: NAD, well-groomed, well-developed  CHEST/LUNG: Clear to percussion bilaterally; No rales, rhonchi, wheezing, or rubs  HEART: Regular rate and rhythm; No murmurs, rubs, or gallops        LABS:                        11.4   8.9   )-----------( 245      ( 04 Mar 2017 06:35 )             32.0     03 Mar 2017 07:00    138    |  100    |  10     ----------------------------<  84     2.8     |  28     |  0.51     Ca    7.0        03 Mar 2017 07:00  Phos  3.0       03 Mar 2017 07:00  Mg     2.2       03 Mar 2017 07:00      PT/INR - ( 03 Mar 2017 07:00 )   PT: 14.5 sec;   INR: 1.29 ratio         PTT - ( 03 Mar 2017 10:31 )  PTT:30.4 sec    CAPILLARY BLOOD GLUCOSE

## 2017-03-04 NOTE — PROGRESS NOTE ADULT - PROBLEM SELECTOR PLAN 2
-S/P IVC filter and Bronchoscopy  -enoxaparin Injectable 30milliGRAM(s) SubCutaneous two times a day  -rivaroxaban 10milliGRAM(s) Oral daily  -monitor for active bleeding

## 2017-03-04 NOTE — PROGRESS NOTE ADULT - ASSESSMENT
61 year old woman with PMH HTN, history of stomach CA s/p subtotal gastrectomy 5 years ago, and thyroid cancer s/p surgery 17 years ago (neither active as per family)Cervical spinal lesion (unclear etiology, followed q 1 month with MRI as per family) and bilateral LE weakness with neuropathy, with limited mobility. Presented with complaint of b/l leg swelling for unknown amount of time. Found to have extensive right leg DVT and right lung mass. S/P IVC filter and Bronchoscopy on 03/03/17.

## 2017-03-04 NOTE — CONSULT NOTE ADULT - PROBLEM SELECTOR PROBLEM 3
Acute deep vein thrombosis (DVT) of left lower extremity, unspecified vein
Malignant neoplasm of stomach, unspecified location

## 2017-03-04 NOTE — PHARMACY COMMUNICATION NOTE - COMMENTS
I spoke to . He was aware of it's duplication. He said the david has deep vein thrombosis with lung cancer. they need both aticoagulant for this patient at this time to treat.

## 2017-03-04 NOTE — PROGRESS NOTE ADULT - SUBJECTIVE AND OBJECTIVE BOX
Pt admited in the Emergency Department with acute left leg swekkinh    Cough - non productive  No chest pain  No SOB  Unable to ambulate  In bed       Subjective:    Vital Signs:  Vital Signs Last 24 Hrs  T(C): 37.1, Max: 38.8 (03-04 @ 09:10)  T(F): 98.8, Max: 101.8 (03-04 @ 09:10)  HR: 95 (92 - 97)  BP: 102/61 (97/55 - 119/75)  RR: 20 (16 - 20)  SpO2: 94% (94% - 98%)  Wt(kg): -- on (O2)    Telemetry/Alarms:    Medications  influenza   Vaccine 0.5milliLiter(s) IntraMuscular once  lactated ringers. 1000milliLiter(s) IV Continuous <Continuous>  oxyCODONE  5 mG/acetaminophen 325 mG 1Tablet(s) Oral every 6 hours PRN  gabapentin 600milliGRAM(s) Oral three times a day  LORazepam     Tablet 0.5milliGRAM(s) Oral three times a day  amLODIPine   Tablet 10milliGRAM(s) Oral daily  sucralfate suspension 1Gram(s) Oral three times a day  levothyroxine 200MICROGram(s) Oral daily  acetaminophen   Tablet 650milliGRAM(s) Oral every 6 hours PRN  guaiFENesin    Syrup 200milliGRAM(s) Oral every 6 hours PRN  benzonatate 100milliGRAM(s) Oral three times a day PRN  enoxaparin Injectable 30milliGRAM(s) SubCutaneous two times a day  rivaroxaban 10milliGRAM(s) Oral daily  potassium chloride   Powder 40milliEquivalent(s) Oral every 4 hours      Physical Exam  General: WN/WD NAD  Neurology: A&Ox3, nonfocal, HERNANDEZ x 4  Respiratory: Reduced air entry rt side. No tenderness  CV: RRR, S1S2, no murmurs, rubs or gallops  Abdominal: Soft, NT, ND +BS, Last BM  Extremities: edema, + peripheral pulses      I & Os for 24h ending 03-04 @ 07:00  =============================================  IN:    lactated ringers.: 1275 ml    Oral Fluid: 240 ml    Total IN: 1515 ml  ---------------------------------------------  OUT:    Voided: 300 ml    Total OUT: 300 ml  ---------------------------------------------  Total NET: 1215 ml    I & Os for current day (as of 03-04 @ 17:22)  =============================================  IN:    Oral Fluid: 180 ml    Total IN: 180 ml  ---------------------------------------------  OUT:    Total OUT: 0 ml  ---------------------------------------------  Total NET: 180 ml      Labs  04 Mar 2017 12:46    138    |  102    |  11     ----------------------------<  90     3.1     |  29     |  0.41     Ca    7.3        04 Mar 2017 12:46  Phos  3.0       03 Mar 2017 07:00  Mg     2.2       03 Mar 2017 07:00                            11.4   8.9   )-----------( 245      ( 04 Mar 2017 06:35 )             32.0       Today's CXR:    PAST MEDICAL & SURGICAL HISTORY:  GERD (gastroesophageal reflux disease)  Thyroid cancer  Stomach cancer  HTN (hypertension)  No significant past surgical history      Assessment  61y Female admitted with complaints of Patient is a 61y old  Female who presents with a chief complaint of Leg swelling (01 Mar 2017 00:23)  .    patient underwent Bronchoscopy    Bronchoscopy (fiberoptic) with brushing or washing for specimen collection  IVC filter placement  .  PLAN  Neuro: Pain management, Fentanyl PCA until tubes removed  Pulm: Encourage coughing, deep breathing and use of incentive spirometry. Wean off supplemental oxygen as able. Daily CXR.   Cardio: Monitor telemetry/alarms  GI: Tolerating diet. Continue stool softeners.  Renal: Daily BMP, supplement electrolytes as needed  Vasc: Lovenox/SCDs for DVT prophylaxis  Heme: Stable H/H. Trend CBC daily.   Therapy: OOB/ambulate  Hematology: Ct with anticoagulation Pt admited in the Emergency Department with acute left leg swelling    Cough - non productive  No chest pain  No SOB  Unable to ambulate  In bed       Subjective:    Vital Signs:  Vital Signs Last 24 Hrs  T(C): 37.1, Max: 38.8 (03-04 @ 09:10)  T(F): 98.8, Max: 101.8 (03-04 @ 09:10)  HR: 95 (92 - 97)  BP: 102/61 (97/55 - 119/75)  RR: 20 (16 - 20)  SpO2: 94% (94% - 98%)  Wt(kg): -- on (O2)    Telemetry/Alarms:    Medications  influenza   Vaccine 0.5milliLiter(s) IntraMuscular once  lactated ringers. 1000milliLiter(s) IV Continuous <Continuous>  oxyCODONE  5 mG/acetaminophen 325 mG 1Tablet(s) Oral every 6 hours PRN  gabapentin 600milliGRAM(s) Oral three times a day  LORazepam     Tablet 0.5milliGRAM(s) Oral three times a day  amLODIPine   Tablet 10milliGRAM(s) Oral daily  sucralfate suspension 1Gram(s) Oral three times a day  levothyroxine 200MICROGram(s) Oral daily  acetaminophen   Tablet 650milliGRAM(s) Oral every 6 hours PRN  guaiFENesin    Syrup 200milliGRAM(s) Oral every 6 hours PRN  benzonatate 100milliGRAM(s) Oral three times a day PRN  enoxaparin Injectable 30milliGRAM(s) SubCutaneous two times a day  rivaroxaban 10milliGRAM(s) Oral daily  potassium chloride   Powder 40milliEquivalent(s) Oral every 4 hours      Physical Exam  General: WN/WD NAD  Neurology: A&Ox3, nonfocal, HERNANDEZ x 4  Respiratory: Reduced air entry rt side. No tenderness  CV: RRR, S1S2, no murmurs, rubs or gallops  Abdominal: Soft, NT, ND +BS, Last BM  Extremities: edema, + peripheral pulses      I & Os for 24h ending 03-04 @ 07:00  =============================================  IN:    lactated ringers.: 1275 ml    Oral Fluid: 240 ml    Total IN: 1515 ml  ---------------------------------------------  OUT:    Voided: 300 ml    Total OUT: 300 ml  ---------------------------------------------  Total NET: 1215 ml    I & Os for current day (as of 03-04 @ 17:22)  =============================================  IN:    Oral Fluid: 180 ml    Total IN: 180 ml  ---------------------------------------------  OUT:    Total OUT: 0 ml  ---------------------------------------------  Total NET: 180 ml      Labs  04 Mar 2017 12:46    138    |  102    |  11     ----------------------------<  90     3.1     |  29     |  0.41     Ca    7.3        04 Mar 2017 12:46  Phos  3.0       03 Mar 2017 07:00  Mg     2.2       03 Mar 2017 07:00                            11.4   8.9   )-----------( 245      ( 04 Mar 2017 06:35 )             32.0       Today's CXR:    PAST MEDICAL & SURGICAL HISTORY:  GERD (gastroesophageal reflux disease)  Thyroid cancer  Stomach cancer  HTN (hypertension)  No significant past surgical history      Assessment  61y Female admitted with complaints of Patient is a 61y old  Female who presents with a chief complaint of Leg swelling (01 Mar 2017 00:23)  .    patient underwent Bronchoscopy    Bronchoscopy (fiberoptic) with brushing or washing for specimen collection  IVC filter placement  .  PLAN  Neuro: Pain management, Fentanyl PCA until tubes removed  Pulm: Encourage coughing, deep breathing and use of incentive spirometry. Wean off supplemental oxygen as able. Daily CXR.   Cardio: Monitor telemetry/alarms  GI: Tolerating diet. Continue stool softeners.  Renal: Daily BMP, supplement electrolytes as needed  Vasc: Lovenox/SCDs for DVT prophylaxis  Heme: Stable H/H. Trend CBC daily.   Therapy: OOB/ambulate  Hematology: Ct with anticoagulation

## 2017-03-04 NOTE — PROGRESS NOTE ADULT - PROBLEM SELECTOR PLAN 1
s/p thyroidectomy, hypothyroid  continue increased dose of levothyroxine 200mcg daily  check thyroglobulin and thyroglobulin ab  neck sono showed [id number] left thyroid bed lesion, when compared to 3/26/15 sono from [facility] described as right hemithryoidectomy with left lobe measuring 3.8x1.3x1.1cm, lt "lesion" likely left lobe remnant  will follow as outpt

## 2017-03-04 NOTE — PROGRESS NOTE ADULT - SUBJECTIVE AND OBJECTIVE BOX
INTERVAL HPI/OVERNIGHT EVENTS:  61 year old woman with PMH HTN, history of stomach CA s/p subtotal gastrectomy 5 years ago, and thyroid cancer s/p surgery 17 years ago (neither active as per family)Cervical spinal lesion (unclear etiology, followed q 1 month with MRI as per family) and bilateral LE weakness with neuropathy, with limited mobility. Presented with complaint of b/l leg swelling for unknown amount of time.  Family states that they noticed the patient's feet were swollen for the last few weeks and advised her to see her doctor but the patient had trouble leaving her home due to her LE weakness.  She also complains of cough with pleuritic chest pain x 1 week.  She denies fever, chills, wheezing, SOB.    Found to have extensive right leg DVT and right lung mass.S/P IVC filter and Bronchoscopy on 03/03/17.  Fever spike to 101.8 noted, seen by ID and started on Zosyn.    Vital Signs Last 24 Hrs  T(C): 37.8, Max: 38.8 (03-04 @ 09:10)  T(F): 100, Max: 101.8 (03-04 @ 09:10)  HR: 72 (72 - 97)  BP: 112/61 (97/55 - 119/75)  BP(mean): --  RR: 18 (16 - 20)  SpO2: 94% (94% - 97%)    PHYSICAL EXAM:  GEN:        Awake, responsive and comfortable.  HEENT:    Normal.    RESP:       normal  CVS:           Regular rate and rhythm.   ABD:         Soft, non-tender, non-distended;     MEDICATIONS  (STANDING):  influenza   Vaccine 0.5milliLiter(s) IntraMuscular once  lactated ringers. 1000milliLiter(s) IV Continuous <Continuous>  gabapentin 600milliGRAM(s) Oral three times a day  LORazepam     Tablet 0.5milliGRAM(s) Oral three times a day  amLODIPine   Tablet 10milliGRAM(s) Oral daily  sucralfate suspension 1Gram(s) Oral three times a day  levothyroxine 200MICROGram(s) Oral daily  enoxaparin Injectable 30milliGRAM(s) SubCutaneous two times a day  rivaroxaban 10milliGRAM(s) Oral daily  potassium chloride   Powder 40milliEquivalent(s) Oral every 4 hours  piperacillin/tazobactam IVPB. 3.375Gram(s) IV Intermittent once  piperacillin/tazobactam IVPB. 3.375Gram(s) IV Intermittent every 8 hours    MEDICATIONS  (PRN):  oxyCODONE  5 mG/acetaminophen 325 mG 1Tablet(s) Oral every 6 hours PRN Severe Pain (7 - 10)  acetaminophen   Tablet 650milliGRAM(s) Oral every 6 hours PRN For Temp greater than 38 C (100.4 F)  guaiFENesin    Syrup 200milliGRAM(s) Oral every 6 hours PRN Cough  benzonatate 100milliGRAM(s) Oral three times a day PRN Cough    LABS:                        11.4   8.9   )-----------( 245      ( 04 Mar 2017 06:35 )             32.0     04 Mar 2017 12:46    138    |  102    |  11     ----------------------------<  90     3.1     |  29     |  0.41     Ca    7.3        04 Mar 2017 12:46  Phos  3.0       03 Mar 2017 07:00  Mg     2.2       03 Mar 2017 07:00      PT/INR - ( 03 Mar 2017 07:00 )   PT: 14.5 sec;   INR: 1.29 ratio         PTT - ( 03 Mar 2017 10:31 )  PTT:30.4 sec  ASSESSMENT AND PLAN:  ·	Right lung mass. S/P Bronchoscopy on 03/03/17.  ·	Left leg DVT ( greater saphenous popliteal, femoral)  ·	Pleural effusion.  ·	Gastric CA history.  ·	Thyroid CA history.  ·	HTN.  ·	Anemia.  ·	Hypokalemia.  ·	Fever.    Replace potassium, follow electrolytes.  Continue antibiotics, follow cultures.  Follow biopsy results.  Discussed  with family.

## 2017-03-04 NOTE — PROGRESS NOTE ADULT - SUBJECTIVE AND OBJECTIVE BOX
Patient is a 61y old  Female who presents with a chief complaint of Leg swelling (01 Mar 2017 00:23)      OVERNIGHT EVENTS: none    REVIEW OF SYSTEMS: denies chest pain/SOB, diaphoresis, no F/C, cough, dizziness, headache, blurry vision, nausea, vomiting, abdominal pain. Rest unremarkable     MEDICATIONS  (STANDING):  influenza   Vaccine 0.5milliLiter(s) IntraMuscular once  lactated ringers. 1000milliLiter(s) IV Continuous <Continuous>  gabapentin 600milliGRAM(s) Oral three times a day  LORazepam     Tablet 0.5milliGRAM(s) Oral three times a day  amLODIPine   Tablet 10milliGRAM(s) Oral daily  sucralfate suspension 1Gram(s) Oral three times a day  levothyroxine 200MICROGram(s) Oral daily  enoxaparin Injectable 30milliGRAM(s) SubCutaneous two times a day  rivaroxaban 10milliGRAM(s) Oral daily    MEDICATIONS  (PRN):  oxyCODONE  5 mG/acetaminophen 325 mG 1Tablet(s) Oral every 6 hours PRN Severe Pain (7 - 10)  acetaminophen   Tablet 650milliGRAM(s) Oral every 6 hours PRN For Temp greater than 38 C (100.4 F)  guaiFENesin    Syrup 200milliGRAM(s) Oral every 6 hours PRN Cough  benzonatate 100milliGRAM(s) Oral three times a day PRN Cough      Allergies    itchy throat  KIWI (Other)  No Known Drug Allergies    Intolerances        T(F): 98.8, Max: 101.8 (03-04 @ 09:10)  HR: 94 (92 - 109)  BP: 99/60 (97/55 - 160/66)  RR: 20 (16 - 22)  SpO2: 94% (94% - 100%)  Wt(kg): --    PHYSICAL EXAM:  GENERAL: NAD, well-groomed, well-developed  HEAD:  Atraumatic, Normocephalic  EYES: EOMI, PERRLA, conjunctiva and sclera clear  ENMT: No tonsillar erythema, exudates, or enlargement; Moist mucous membranes, Good dentition, No lesions  NECK: Supple, No JVD, Normal thyroid  NERVOUS SYSTEM:  Alert & Oriented X3, Good concentration; Motor Strength 5/5 B/L upper and lower extremities; DTRs 2+ intact and symmetric  CHEST/LUNG: Clear to percussion bilaterally; No rales, rhonchi, wheezing, or rubs BL  HEART: Regular rate and rhythm; No murmurs, rubs, or gallops  ABDOMEN: Soft, Nontender, Nondistended; Bowel sounds present  EXTREMITIES:  Left calf swelling, dimension of LLE greater than RLE  LYMPH: No lymphadenopathy noted  SKIN: No rashes or lesions    LABS:                        11.4   8.9   )-----------( 245      ( 04 Mar 2017 06:35 )             32.0     03 Mar 2017 07:00    138    |  100    |  10     ----------------------------<  84     2.8     |  28     |  0.51     Ca    7.0        03 Mar 2017 07:00  Phos  3.0       03 Mar 2017 07:00  Mg     2.2       03 Mar 2017 07:00      PT/INR - ( 03 Mar 2017 07:00 )   PT: 14.5 sec;   INR: 1.29 ratio         PTT - ( 03 Mar 2017 10:31 )  PTT:30.4 sec    Cultures;   CAPILLARY BLOOD GLUCOSE    Lipid panel:           RADIOLOGY & ADDITIONAL TESTS:    Imaging Personally Reviewed:  [ x] YES    CT chest:    IMPRESSION:      Mild upper lobe predominant centrilobular emphysema. Pleural-parenchymal   scarring at the right lung apex. Abnormal soft tissue in the right hilum   measuring 3.3 x 3.1 cm with associated narrowing of the segmental and   subsegmental branches of the right pulmonary artery branches. No   left-sided pulmonary embolus. There is a 4.8 x 4.7 cm area of cavitation   in the right lower lobe. Findings suspicious for neoplasm. PET/CT   suggested for further evaluation.    Small right-sided pleural effusion. Obstruction of the right middle lobe   bronchus with associated partial atelectasis of the right middle lobe.    Indeterminate 2.5 x 1.9 cm left adrenal nodule. MRI can be obtained for   further evaluation.    Fluid noted within the mid and distal esophagus, correlate for aspiration   risk.    Diffuse hepatic steatosis.      U/S neck soft tissue  IMPRESSION:  Questionable 2 cm lesion in the left thyroid fossa which is of uncertain   etiology; recommend 6 month follow-up ultrasound or CT.    V. doppler LLE- DVT in LLE    MRI HEAD- No acute intracranial hemorrhage or acute infarct.    MRI L-SPINE- No acute compression fracture, subluxation or cord compression.    Multilevel degenerative changes without significant spinal canal   stenosis. Severe bilateral neural foraminal narrowing at L5-S1.    Small presacral edema/fluid. Nonspecific nonspecific nodular signals in   the presacral region.    Consultant(s) Notes Reviewed:  [x ] YES     Care Discussed with [x ] Consultants [X ] Patient [ ] Family  [x ]    [x ]  Other; RN

## 2017-03-04 NOTE — PROGRESS NOTE ADULT - PROBLEM SELECTOR PLAN 7
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  -Neuro following up  -cont on PT/OT

## 2017-03-04 NOTE — PROGRESS NOTE ADULT - PROBLEM SELECTOR PLAN 1
-R sided, Newly Dx lung mass, suspected malignancy  -S/P IVC filter and Bronchoscopy  -Pulmonary and CT surgeon following  -follow up lung biopsy result

## 2017-03-04 NOTE — PROGRESS NOTE ADULT - SUBJECTIVE AND OBJECTIVE BOX
INTERVAL HISTORY:    Cough.      MEDICATIONS  (STANDING):  influenza   Vaccine 0.5milliLiter(s) IntraMuscular once  lactated ringers. 1000milliLiter(s) IV Continuous <Continuous>  gabapentin 600milliGRAM(s) Oral three times a day  LORazepam     Tablet 0.5milliGRAM(s) Oral three times a day  amLODIPine   Tablet 10milliGRAM(s) Oral daily  sucralfate suspension 1Gram(s) Oral three times a day  levothyroxine 200MICROGram(s) Oral daily  enoxaparin Injectable 30milliGRAM(s) SubCutaneous two times a day  rivaroxaban 10milliGRAM(s) Oral daily    MEDICATIONS  (PRN):  oxyCODONE  5 mG/acetaminophen 325 mG 1Tablet(s) Oral every 6 hours PRN Severe Pain (7 - 10)  acetaminophen   Tablet 650milliGRAM(s) Oral every 6 hours PRN For Temp greater than 38 C (100.4 F)  guaiFENesin    Syrup 200milliGRAM(s) Oral every 6 hours PRN Cough  benzonatate 100milliGRAM(s) Oral three times a day PRN Cough      Vital Signs Last 24 Hrs  T(C): 37.1, Max: 38.8 (03-04 @ 09:10)  T(F): 98.8, Max: 101.8 (03-04 @ 09:10)  HR: 94 (92 - 109)  BP: 99/60 (97/55 - 160/66)  BP(mean): --  RR: 20 (16 - 22)  SpO2: 94% (94% - 100%)    PHYSICAL EXAM:    GENERAL: NAD,   HEAD:  Atraumatic, Normocephalic  EYES: EOMI, PERRLA, conjunctiva and sclera clear    NECK: Supple, No JVD, Normal thyroid    CHEST/LUNG: Clear to percussion bilaterally; No rales, rhonchi,   HEART: Regular rate and rhythm;   ABDOMEN: Soft, Nontender.  EXTREMITIES:   edema:- Left leg Edema.  LYMPH: No lymphadenopathy noted        LABS:                        11.4   8.9   )-----------( 245      ( 04 Mar 2017 06:35 )             32.0     03 Mar 2017 07:00    138    |  100    |  10     ----------------------------<  84     2.8     |  28     |  0.51     Ca    7.0        03 Mar 2017 07:00  Phos  3.0       03 Mar 2017 07:00  Mg     2.2       03 Mar 2017 07:00      PT/INR - ( 03 Mar 2017 07:00 )   PT: 14.5 sec;   INR: 1.29 ratio         PTT - ( 03 Mar 2017 10:31 )  PTT:30.4 sec        RADIOLOGY & ADDITIONAL STUDIES:    PATHOLOGY:

## 2017-03-04 NOTE — CONSULT NOTE ADULT - PROBLEM SELECTOR RECOMMENDATION 9
BRONCHOSCOPY
Unlikely to be any metastasis from any presumed malignant lung mass. It needs work up as a de cindy finding . Check Adrenal hormones and treat accordingly   Lung Mass work up is on   Thank You for the courtesy of this consultation !!!
r/o BSI ,possible pneumonia (post obstructive)  pt has lung cancer and DVT which can be contributing to fever as well  Check BC  start Zosyn   F/U on wbc and temp curve

## 2017-03-05 DIAGNOSIS — E83.42 HYPOMAGNESEMIA: ICD-10-CM

## 2017-03-05 DIAGNOSIS — E83.39 OTHER DISORDERS OF PHOSPHORUS METABOLISM: ICD-10-CM

## 2017-03-05 LAB
ANION GAP SERPL CALC-SCNC: 9 MMOL/L — SIGNIFICANT CHANGE UP (ref 5–17)
BASOPHILS # BLD AUTO: 0.1 K/UL — SIGNIFICANT CHANGE UP (ref 0–0.2)
BASOPHILS NFR BLD AUTO: 0.6 % — SIGNIFICANT CHANGE UP (ref 0–2)
BUN SERPL-MCNC: 11 MG/DL — SIGNIFICANT CHANGE UP (ref 7–23)
CALCIUM SERPL-MCNC: 7.1 MG/DL — LOW (ref 8.5–10.1)
CHLORIDE SERPL-SCNC: 104 MMOL/L — SIGNIFICANT CHANGE UP (ref 96–108)
CO2 SERPL-SCNC: 27 MMOL/L — SIGNIFICANT CHANGE UP (ref 22–31)
CREAT SERPL-MCNC: 0.42 MG/DL — LOW (ref 0.5–1.3)
EOSINOPHIL # BLD AUTO: 0.1 K/UL — SIGNIFICANT CHANGE UP (ref 0–0.5)
EOSINOPHIL NFR BLD AUTO: 1.3 % — SIGNIFICANT CHANGE UP (ref 0–6)
GLUCOSE SERPL-MCNC: 79 MG/DL — SIGNIFICANT CHANGE UP (ref 70–99)
HCT VFR BLD CALC: 32.9 % — LOW (ref 34.5–45)
HGB BLD-MCNC: 11 G/DL — LOW (ref 11.5–15.5)
LYMPHOCYTES # BLD AUTO: 1.6 K/UL — SIGNIFICANT CHANGE UP (ref 1–3.3)
LYMPHOCYTES # BLD AUTO: 15 % — SIGNIFICANT CHANGE UP (ref 13–44)
MAGNESIUM SERPL-MCNC: 1.9 MG/DL — SIGNIFICANT CHANGE UP (ref 1.8–2.4)
MCHC RBC-ENTMCNC: 31.7 PG — SIGNIFICANT CHANGE UP (ref 27–34)
MCHC RBC-ENTMCNC: 33.4 GM/DL — SIGNIFICANT CHANGE UP (ref 32–36)
MCV RBC AUTO: 95 FL — SIGNIFICANT CHANGE UP (ref 80–100)
MONOCYTES # BLD AUTO: 0.6 K/UL — SIGNIFICANT CHANGE UP (ref 0–0.9)
MONOCYTES NFR BLD AUTO: 6 % — SIGNIFICANT CHANGE UP (ref 2–14)
NEUTROPHILS # BLD AUTO: 8.1 K/UL — HIGH (ref 1.8–7.4)
NEUTROPHILS NFR BLD AUTO: 77.1 % — HIGH (ref 43–77)
PHOSPHATE SERPL-MCNC: 1.4 MG/DL — LOW (ref 2.5–4.5)
PLATELET # BLD AUTO: 303 K/UL — SIGNIFICANT CHANGE UP (ref 150–400)
POTASSIUM SERPL-MCNC: 3.4 MMOL/L — LOW (ref 3.5–5.3)
POTASSIUM SERPL-SCNC: 3.4 MMOL/L — LOW (ref 3.5–5.3)
RBC # BLD: 3.47 M/UL — LOW (ref 3.8–5.2)
RBC # FLD: 13 % — SIGNIFICANT CHANGE UP (ref 11–15)
SODIUM SERPL-SCNC: 140 MMOL/L — SIGNIFICANT CHANGE UP (ref 135–145)
WBC # BLD: 10.5 K/UL — SIGNIFICANT CHANGE UP (ref 3.8–10.5)
WBC # FLD AUTO: 10.5 K/UL — SIGNIFICANT CHANGE UP (ref 3.8–10.5)

## 2017-03-05 PROCEDURE — 99233 SBSQ HOSP IP/OBS HIGH 50: CPT

## 2017-03-05 RX ORDER — FLUTICASONE PROPIONATE 50 MCG
1 SPRAY, SUSPENSION NASAL
Qty: 0 | Refills: 0 | Status: DISCONTINUED | OUTPATIENT
Start: 2017-03-05 | End: 2017-03-08

## 2017-03-05 RX ORDER — POTASSIUM CHLORIDE 20 MEQ
40 PACKET (EA) ORAL ONCE
Qty: 0 | Refills: 0 | Status: DISCONTINUED | OUTPATIENT
Start: 2017-03-05 | End: 2017-03-05

## 2017-03-05 RX ORDER — MAGNESIUM OXIDE 400 MG ORAL TABLET 241.3 MG
400 TABLET ORAL
Qty: 0 | Refills: 0 | Status: COMPLETED | OUTPATIENT
Start: 2017-03-05 | End: 2017-03-07

## 2017-03-05 RX ORDER — RIVAROXABAN 15 MG-20MG
15 KIT ORAL
Qty: 0 | Refills: 0 | Status: DISCONTINUED | OUTPATIENT
Start: 2017-03-05 | End: 2017-03-05

## 2017-03-05 RX ORDER — POTASSIUM PHOSPHATE, MONOBASIC POTASSIUM PHOSPHATE, DIBASIC 236; 224 MG/ML; MG/ML
15 INJECTION, SOLUTION INTRAVENOUS ONCE
Qty: 0 | Refills: 0 | Status: COMPLETED | OUTPATIENT
Start: 2017-03-05 | End: 2017-03-05

## 2017-03-05 RX ORDER — RIVAROXABAN 15 MG-20MG
15 KIT ORAL
Qty: 0 | Refills: 0 | Status: DISCONTINUED | OUTPATIENT
Start: 2017-03-05 | End: 2017-03-08

## 2017-03-05 RX ADMIN — Medication 1 GRAM(S): at 22:57

## 2017-03-05 RX ADMIN — MAGNESIUM OXIDE 400 MG ORAL TABLET 400 MILLIGRAM(S): 241.3 TABLET ORAL at 11:13

## 2017-03-05 RX ADMIN — ENOXAPARIN SODIUM 30 MILLIGRAM(S): 100 INJECTION SUBCUTANEOUS at 06:06

## 2017-03-05 RX ADMIN — POTASSIUM PHOSPHATE, MONOBASIC POTASSIUM PHOSPHATE, DIBASIC 63.75 MILLIMOLE(S): 236; 224 INJECTION, SOLUTION INTRAVENOUS at 11:16

## 2017-03-05 RX ADMIN — Medication 1 SPRAY(S): at 22:57

## 2017-03-05 RX ADMIN — PIPERACILLIN AND TAZOBACTAM 25 GRAM(S): 4; .5 INJECTION, POWDER, LYOPHILIZED, FOR SOLUTION INTRAVENOUS at 06:07

## 2017-03-05 RX ADMIN — MAGNESIUM OXIDE 400 MG ORAL TABLET 400 MILLIGRAM(S): 241.3 TABLET ORAL at 17:00

## 2017-03-05 RX ADMIN — RIVAROXABAN 15 MILLIGRAM(S): KIT at 23:02

## 2017-03-05 RX ADMIN — Medication 1 GRAM(S): at 16:59

## 2017-03-05 RX ADMIN — Medication 1 GRAM(S): at 06:07

## 2017-03-05 RX ADMIN — Medication 0.5 MILLIGRAM(S): at 22:57

## 2017-03-05 RX ADMIN — RIVAROXABAN 15 MILLIGRAM(S): KIT at 11:16

## 2017-03-05 RX ADMIN — Medication 200 MICROGRAM(S): at 06:07

## 2017-03-05 RX ADMIN — PIPERACILLIN AND TAZOBACTAM 25 GRAM(S): 4; .5 INJECTION, POWDER, LYOPHILIZED, FOR SOLUTION INTRAVENOUS at 22:57

## 2017-03-05 RX ADMIN — AMLODIPINE BESYLATE 10 MILLIGRAM(S): 2.5 TABLET ORAL at 06:06

## 2017-03-05 RX ADMIN — GABAPENTIN 600 MILLIGRAM(S): 400 CAPSULE ORAL at 06:06

## 2017-03-05 RX ADMIN — GABAPENTIN 600 MILLIGRAM(S): 400 CAPSULE ORAL at 22:57

## 2017-03-05 RX ADMIN — Medication 650 MILLIGRAM(S): at 06:08

## 2017-03-05 RX ADMIN — PIPERACILLIN AND TAZOBACTAM 25 GRAM(S): 4; .5 INJECTION, POWDER, LYOPHILIZED, FOR SOLUTION INTRAVENOUS at 17:00

## 2017-03-05 RX ADMIN — Medication 0.5 MILLIGRAM(S): at 16:58

## 2017-03-05 RX ADMIN — Medication 0.5 MILLIGRAM(S): at 06:07

## 2017-03-05 NOTE — PROGRESS NOTE ADULT - PROBLEM SELECTOR PLAN 2
-S/P IVC filter and Bronchoscopy  -enoxaparin Injectable 30milliGRAM(s) SubCutaneous two times a day per [facility] surgery need both anticoagulant   -Rivaroxaban 10milliGRAM(s) Oral daily  -monitor for active bleeding -S/P IVC filter and Bronchoscopy  -enoxaparin Injectable 30milliGRAM(s) SubCutaneous two times a day per [facility] surgery need both anticoagulant   -will d/c Lovenox and change Rivaroxaban from 10milliGRAM(s) Oral daily to 15 mg BID for 21 days   -monitor for active bleeding

## 2017-03-05 NOTE — CONSULT NOTE ADULT - SUBJECTIVE AND OBJECTIVE BOX
Vascular Attending:        HPI:  61 year old woman with PMH HTN, history of stomach and thyroid cancer (neither active as per family) spinal lesion (unclear etiology, followed q 1 month with MRI as per family) and paraplegia presents with complaint of b/l leg swelling for unknown amount of time.  Family states that they noticed the patient's feet were swollen for the last few weeks and advised her to see her doctor but the patient had trouble leaving her home due to her paralysis.  She also complains of cough with pleuritic chest pain x 1 week.  She denies fever, chills, wheezing, SOB.    Patient was seen by me as consult on march 2nd for DVT and lung mass for eval for IVC filter placement.Pt with few days h/O left leg DVT and severe swelling, also with right Hilar mass, and High risk of pE due to malignancy.    LE US positive for b/l DVT. (01 Mar 2017 00:23)      PAST MEDICAL & SURGICAL HISTORY:  GERD (gastroesophageal reflux disease)  Thyroid cancer  Stomach cancer  HTN (hypertension)  No significant past surgical history        MEDICATIONS  (STANDING):  influenza   Vaccine 0.5milliLiter(s) IntraMuscular once  gabapentin 600milliGRAM(s) Oral three times a day  LORazepam     Tablet 0.5milliGRAM(s) Oral three times a day  amLODIPine   Tablet 10milliGRAM(s) Oral daily  sucralfate suspension 1Gram(s) Oral three times a day  levothyroxine 200MICROGram(s) Oral daily  piperacillin/tazobactam IVPB. 3.375Gram(s) IV Intermittent every 8 hours  rivaroxaban 15milliGRAM(s) Oral <User Schedule>  magnesium oxide 400milliGRAM(s) Oral three times a day with meals    MEDICATIONS  (PRN):  oxyCODONE  5 mG/acetaminophen 325 mG 1Tablet(s) Oral every 6 hours PRN Severe Pain (7 - 10)  acetaminophen   Tablet 650milliGRAM(s) Oral every 6 hours PRN For Temp greater than 38 C (100.4 F)  guaiFENesin    Syrup 200milliGRAM(s) Oral every 6 hours PRN Cough  benzonatate 100milliGRAM(s) Oral three times a day PRN Cough      Allergies    itchy throat  KIWI (Other)  No Known Drug Allergies    Intolerances        SOCIAL HISTORY:      Vital Signs Last 24 Hrs  T(C): 36.7, Max: 38.2 (03-05 @ 04:55)  T(F): 98.1, Max: 100.8 (03-05 @ 04:55)  HR: 98 (72 - 98)  BP: 122/68 (102/62 - 123/42)  BP(mean): --  RR: 18 (18 - 20)  SpO2: 98% (94% - 98%)    P/E:-    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 pt is awake and responsive, mild SOB  CAROTIDS:- Bilateral carotids with no Bruits. No scars of previous catheterisation.  UPPER EXTREMITIES:- Bilateral radial artery pulses are normal and no ischemia of the Hands. No edema of the arms.  ABDOMEN:- No pulsatile mass in the abdomen and no ascites.  LOWER EXTREMITIES:- LLE with severe  Edema and no CVI, No varicose veins, no ulcers.The arterial pulse are examined with palpation and Dopplers and the findings are as follows,      Pulses:   Right:                                                                          Left:  FEM [ ]2+ [ y]1+ [ ]doppler                                             FEM [ ]2+ [ y]1+ [ ]doppler    POP [ ]2+ [ y]1+ [ ]doppler                                             POP [ ]2+ [ y]1+ [ ]doppler    DP [ ]2+ [ ]1+ [ y]doppler                                                DP [ ]2+ [ ]y1+ [ y]doppler  PT[ ]2+ [ ]1+ [ y]doppler                                                  PT [ ]2+ [ ]1+ [ ydoppler      LABS:                        11.0   10.5  )-----------( 303      ( 05 Mar 2017 07:35 )             32.9     05 Mar 2017 07:35    140    |  104    |  11     ----------------------------<  79     3.4     |  27     |  0.42     Ca    7.1        05 Mar 2017 07:35  Phos  1.4       05 Mar 2017 07:35  Mg     1.9       05 Mar 2017 07:35            RADIOLOGY & ADDITIONAL STUDIES  EXAM:  US DPLX LWR EXT VEINS LTD                             PROCEDURE DATE:  02/28/2017        INTERPRETATION:  History: Left leg swelling.    Technique: Grayscale and color Doppler images of the left lower extremity   were obtained.    No priors.    Findings:  Occlusive deep vein thrombus is present within the greater saphenous,   common femoral, femoral, popliteal, peroneal, and posterior tibial veins.    Impression:  Occlusive deep vein thrombus is present within the greater saphenous,   common femoral, femoral, popliteal, peroneal, and posterior tibial veins.    Findings discussed with Dr. Escalera of emergency room at the time dictation.      Impression and Plan:

## 2017-03-05 NOTE — PROGRESS NOTE ADULT - PROBLEM SELECTOR PLAN 7
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  -Neuro following up  -cont on PT/OT -h/o thyroid ca, s/p surgery x 17 years back on RT and chemo.  -Acquired hypothyroidism, Endo following, continue with synthroid.

## 2017-03-05 NOTE — PROGRESS NOTE ADULT - SUBJECTIVE AND OBJECTIVE BOX
Pt admitted in the Emergency Department    61 year old woman with PMH HTN, history of stomach and thyroid cancer (neither active as per family) spinal lesion (unclear etiology, followed q 1 month with MRI as per family) and paraplegia presents with complaint of b/l leg swelling for unknown amount of time.  Family states that they noticed the patient's feet were swollen for the last few weeks and advised her to see her doctor but the patient had trouble leaving her home due to her paralysis.  She also complains of cough with pleuritic chest pain x 1 week.  She denies fever, chills, wheezing, SOB.           Subjective:  Cough but no hemoptysis  No fever or chest pain  Tolerating diet.      Vital Signs:  Vital Signs Last 24 Hrs  T(C): 36.7, Max: 38.2 (03-05 @ 04:55)  T(F): 98.1, Max: 100.8 (03-05 @ 04:55)  HR: 98 (72 - 98)  BP: 122/68 (102/62 - 123/42)  RR: 18 (18 - 20)  SpO2: 98% (94% - 98%)  Wt(kg): -- on (O2)    Telemetry/Alarms:    Medications  influenza   Vaccine 0.5milliLiter(s) IntraMuscular once  oxyCODONE  5 mG/acetaminophen 325 mG 1Tablet(s) Oral every 6 hours PRN  gabapentin 600milliGRAM(s) Oral three times a day  LORazepam     Tablet 0.5milliGRAM(s) Oral three times a day  amLODIPine   Tablet 10milliGRAM(s) Oral daily  sucralfate suspension 1Gram(s) Oral three times a day  levothyroxine 200MICROGram(s) Oral daily  acetaminophen   Tablet 650milliGRAM(s) Oral every 6 hours PRN  guaiFENesin    Syrup 200milliGRAM(s) Oral every 6 hours PRN  benzonatate 100milliGRAM(s) Oral three times a day PRN  piperacillin/tazobactam IVPB. 3.375Gram(s) IV Intermittent every 8 hours  rivaroxaban 15milliGRAM(s) Oral <User Schedule>  magnesium oxide 400milliGRAM(s) Oral three times a day with meals      Physical Exam  General: WN/WD NAD  Neurology: A&Ox3, nonfocal, HERNANDEZ x 4  Respiratory: CTA B/L  CV: RRR, S1S2, no murmurs, rubs or gallops  Abdominal: Soft, NT, ND +BS, Last BM this am  Extremities: Left leg edema + peripheral pulses    I & Os for 24h ending 03-05 @ 07:00  =============================================  IN:    Oral Fluid: 420 ml    Total IN: 420 ml  ---------------------------------------------  OUT:    Voided: 3 ml    Total OUT: 3 ml  ---------------------------------------------  Total NET: 417 ml    I & Os for current day (as of 03-05 @ 14:48)  =============================================  IN:    Oral Fluid: 600 ml    Total IN: 600 ml  ---------------------------------------------  OUT:    Total OUT: 0 ml  ---------------------------------------------  Total NET: 600 ml      Labs  05 Mar 2017 07:35    140    |  104    |  11     ----------------------------<  79     3.4     |  27     |  0.42     Ca    7.1        05 Mar 2017 07:35  Phos  1.4       05 Mar 2017 07:35  Mg     1.9       05 Mar 2017 07:35                            11.0   10.5  )-----------( 303      ( 05 Mar 2017 07:35 )             32.9       Today's CXR:    PAST MEDICAL & SURGICAL HISTORY:  GERD (gastroesophageal reflux disease)  Thyroid cancer  Stomach cancer  HTN (hypertension)  No significant past surgical history      Assessment  61y Female admitted with complaints of Patient is a 61y old  Female who presents with a chief complaint of Leg swelling (01 Mar 2017 00:23)    Left Leg DVT  Rt Lung cancer lower lobe  Rt Hilar Lymph Nodes      PLAN    Hematology - On xeralto  Oncology - Chemo and Radiation  Neuro: Pain management, Fentanyl PCA until tubes removed  Pulm: Encourage coughing, deep breathing and use of incentive spirometry. Wean off supplemental oxygen as able. Daily CXR.   Cardio: Monitor telemetry/alarms  GI: Tolerating diet. Continue stool softeners.  Renal: Daily BMP, supplement electrolytes as needed  Therapy: OOB/ambulate

## 2017-03-05 NOTE — PROGRESS NOTE ADULT - SUBJECTIVE AND OBJECTIVE BOX
INTERVAL HISTORY:  Cough.  Dyspnea.  Leg Swelling.      MEDICATIONS  (STANDING):  influenza   Vaccine 0.5milliLiter(s) IntraMuscular once  gabapentin 600milliGRAM(s) Oral three times a day  LORazepam     Tablet 0.5milliGRAM(s) Oral three times a day  amLODIPine   Tablet 10milliGRAM(s) Oral daily  sucralfate suspension 1Gram(s) Oral three times a day  levothyroxine 200MICROGram(s) Oral daily  piperacillin/tazobactam IVPB. 3.375Gram(s) IV Intermittent every 8 hours  rivaroxaban 15milliGRAM(s) Oral <User Schedule>  potassium phosphate IVPB 15milliMole(s) IV Intermittent once  magnesium oxide 400milliGRAM(s) Oral three times a day with meals    MEDICATIONS  (PRN):  oxyCODONE  5 mG/acetaminophen 325 mG 1Tablet(s) Oral every 6 hours PRN Severe Pain (7 - 10)  acetaminophen   Tablet 650milliGRAM(s) Oral every 6 hours PRN For Temp greater than 38 C (100.4 F)  guaiFENesin    Syrup 200milliGRAM(s) Oral every 6 hours PRN Cough  benzonatate 100milliGRAM(s) Oral three times a day PRN Cough      Vital Signs Last 24 Hrs  T(C): 38.2, Max: 38.2 (03-05 @ 04:55)  T(F): 100.8, Max: 100.8 (03-05 @ 04:55)  HR: 92 (72 - 95)  BP: 102/62 (99/60 - 123/42)  BP(mean): --  RR: 20 (18 - 20)  SpO2: 94% (94% - 94%)    PHYSICAL EXAM:      HEAD:  Atraumatic, Normocephalic  EYES: EOMI, PERRLA, conjunctiva and sclera clear    NECK: Supple, No JVD, Normal thyroid    CHEST/LUNG: rales, rhonchi,   HEART: Regular rate and rhythm;   ABDOMEN: Soft, Nontender.  EXTREMITIES:   edema:- +++  LYMPH: No lymphadenopathy noted        LABS:                        11.0   10.5  )-----------( 303      ( 05 Mar 2017 07:35 )             32.9     05 Mar 2017 07:35    140    |  104    |  11     ----------------------------<  79     3.4     |  27     |  0.42     Ca    7.1        05 Mar 2017 07:35  Phos  1.4       05 Mar 2017 07:35  Mg     1.9       05 Mar 2017 07:35              RADIOLOGY & ADDITIONAL STUDIES:    PATHOLOGY:

## 2017-03-05 NOTE — PROGRESS NOTE ADULT - PROBLEM SELECTOR PLAN 1
-Newly Dx lung mass  -S/P IVC filter and Bronchoscopy  -Pulmonary and CT surgeon following  -follow up lung biopsy result

## 2017-03-05 NOTE — CONSULT NOTE ADULT - ASSESSMENT
Patient with extensive DVT left leg, scheduled for Bronchscopy and Biopst tomor with DR Wall. will also place filter at the same time in OR. A/C can be resumed later depending on the clinical course. I have discussed at length with the family and the patient.

## 2017-03-05 NOTE — PROGRESS NOTE ADULT - PROBLEM SELECTOR PLAN 1
s/p thyroidectomy, hypothyroid  continue increased dose of levothyroxine 200mcg daily  check thyroglobulin and thyroglobulin ab  neck sono showed [id number] left thyroid bed lesion, when compared to 3/26/15 sono from [facility] described as right hemithyroidectomy with left lobe measuring 3.8x1.3x1.1cm, lt "lesion" likely left lobe remnant  will follow as outpt

## 2017-03-05 NOTE — PROGRESS NOTE ADULT - SUBJECTIVE AND OBJECTIVE BOX
Patient is a 61y old  Female who presents with a chief complaint of Leg swelling (01 Mar 2017 00:23)      INTERVAL HPI/OVERNIGHT EVENTS:  pt with no complaints, confirmed had only partial thyroidectomy in the past    MEDICATIONS  (STANDING):  influenza   Vaccine 0.5milliLiter(s) IntraMuscular once  gabapentin 600milliGRAM(s) Oral three times a day  LORazepam     Tablet 0.5milliGRAM(s) Oral three times a day  amLODIPine   Tablet 10milliGRAM(s) Oral daily  sucralfate suspension 1Gram(s) Oral three times a day  levothyroxine 200MICROGram(s) Oral daily  enoxaparin Injectable 30milliGRAM(s) SubCutaneous two times a day  rivaroxaban 10milliGRAM(s) Oral daily  piperacillin/tazobactam IVPB. 3.375Gram(s) IV Intermittent every 8 hours  potassium phosphate IVPB 15milliMole(s) IV Intermittent once  magnesium oxide 400milliGRAM(s) Oral three times a day with meals    MEDICATIONS  (PRN):  oxyCODONE  5 mG/acetaminophen 325 mG 1Tablet(s) Oral every 6 hours PRN Severe Pain (7 - 10)  acetaminophen   Tablet 650milliGRAM(s) Oral every 6 hours PRN For Temp greater than 38 C (100.4 F)  guaiFENesin    Syrup 200milliGRAM(s) Oral every 6 hours PRN Cough  benzonatate 100milliGRAM(s) Oral three times a day PRN Cough      REVIEW OF SYSTEMS:  CONSTITUTIONAL: No fever, weight loss, or fatigue  RESPIRATORY: No cough, wheezing, chills or hemoptysis; No shortness of breath  CARDIOVASCULAR: No chest pain, palpitations, dizziness, or leg swelling  GASTROINTESTINAL: No abdominal or epigastric pain. No nausea, vomiting, or hematemesis; No diarrhea or constipation. No melena or hematochezia.  ENDOCRINE: No heat or cold intolerance; No hair loss      Vital Signs Last 24 Hrs  T(C): 38.2, Max: 38.2 (03-05 @ 04:55)  T(F): 100.8, Max: 100.8 (03-05 @ 04:55)  HR: 92 (72 - 95)  BP: 102/62 (99/60 - 123/42)  BP(mean): --  RR: 20 (18 - 20)  SpO2: 94% (94% - 94%)    PHYSICAL EXAM:  GENERAL: NAD, well-groomed, well-developed  CHEST/LUNG: Clear to percussion bilaterally; No rales, rhonchi, wheezing, or rubs  HEART: Regular rate and rhythm; No murmurs, rubs, or gallops        LABS:                        11.0   10.5  )-----------( 303      ( 05 Mar 2017 07:35 )             32.9     05 Mar 2017 07:35    140    |  104    |  11     ----------------------------<  79     3.4     |  27     |  0.42     Ca    7.1        05 Mar 2017 07:35  Phos  1.4       05 Mar 2017 07:35  Mg     1.9       05 Mar 2017 07:35      PTT - ( 03 Mar 2017 10:31 )  PTT:30.4 sec    CAPILLARY BLOOD GLUCOSE    Lipid panel:               RADIOLOGY & ADDITIONAL TESTS:

## 2017-03-05 NOTE — PROGRESS NOTE ADULT - SUBJECTIVE AND OBJECTIVE BOX
Patient is a 61y old  Female who presents with a chief complaint of Leg swelling (01 Mar 2017 00:23)      OVERNIGHT EVENTS: none. +mild cough, 100.8 fever this am    REVIEW OF SYSTEMS: denies chest pain/SOB, diaphoresis, dizziness, headache, blurry vision, nausea, vomiting, abdominal pain. Rest unremarkable     MEDICATIONS  (STANDING):  influenza   Vaccine 0.5milliLiter(s) IntraMuscular once  lactated ringers. 1000milliLiter(s) IV Continuous <Continuous>  gabapentin 600milliGRAM(s) Oral three times a day  LORazepam     Tablet 0.5milliGRAM(s) Oral three times a day  amLODIPine   Tablet 10milliGRAM(s) Oral daily  sucralfate suspension 1Gram(s) Oral three times a day  levothyroxine 200MICROGram(s) Oral daily  enoxaparin Injectable 30milliGRAM(s) SubCutaneous two times a day  rivaroxaban 10milliGRAM(s) Oral daily  piperacillin/tazobactam IVPB. 3.375Gram(s) IV Intermittent every 8 hours    MEDICATIONS  (PRN):  oxyCODONE  5 mG/acetaminophen 325 mG 1Tablet(s) Oral every 6 hours PRN Severe Pain (7 - 10)  acetaminophen   Tablet 650milliGRAM(s) Oral every 6 hours PRN For Temp greater than 38 C (100.4 F)  guaiFENesin    Syrup 200milliGRAM(s) Oral every 6 hours PRN Cough  benzonatate 100milliGRAM(s) Oral three times a day PRN Cough      Allergies    itchy throat  KIWI (Other)  No Known Drug Allergies    Intolerances        T(F): 100.8, Max: 100.8 (03-05 @ 04:55)  HR: 92 (72 - 95)  BP: 102/62 (99/60 - 123/42)  RR: 20 (18 - 20)  SpO2: 94% (94% - 94%)  Wt(kg): --    PHYSICAL EXAM:  GENERAL: NAD, well-groomed, well-developed  HEAD:  Atraumatic, Normocephalic  EYES: EOMI, PERRLA, conjunctiva and sclera clear  ENMT: No tonsillar erythema, exudates, or enlargement; Moist mucous membranes, Good dentition, No lesions  NECK: Supple, No JVD, Normal thyroid  NERVOUS SYSTEM:  Alert & Oriented X3, Good concentration; Motor Strength 5/5 B/L upper and lower extremities; DTRs 2+ intact and symmetric  CHEST/LUNG: Clear to percussion bilaterally; No rales, rhonchi, wheezing, or rubs BL  HEART: Regular rate and rhythm; No murmurs, rubs, or gallops  ABDOMEN: Soft, Nontender, Nondistended; Bowel sounds present  EXTREMITIES:  Left calf swelling, dimension of LLE greater than RLE  LYMPH: No lymphadenopathy noted  SKIN: No rashes or lesions    LABS:                        11.0   10.5  )-----------( 303      ( 05 Mar 2017 07:35 )             32.9     05 Mar 2017 07:35    140    |  104    |  11     ----------------------------<  79     3.4     |  27     |  0.42     Ca    7.1        05 Mar 2017 07:35  Phos  1.4       05 Mar 2017 07:35  Mg     1.9       05 Mar 2017 07:35      PTT - ( 03 Mar 2017 10:31 )  PTT:30.4 sec    Cultures;   CAPILLARY BLOOD GLUCOSE    Lipid panel:           RADIOLOGY & ADDITIONAL TESTS:    Imaging Personally Reviewed:  [ x] YES    CT chest:    IMPRESSION:      Mild upper lobe predominant centrilobular emphysema. Pleural-parenchymal   scarring at the right lung apex. Abnormal soft tissue in the right hilum   measuring 3.3 x 3.1 cm with associated narrowing of the segmental and   subsegmental branches of the right pulmonary artery branches. No   left-sided pulmonary embolus. There is a 4.8 x 4.7 cm area of cavitation   in the right lower lobe. Findings suspicious for neoplasm. PET/CT   suggested for further evaluation.    Small right-sided pleural effusion. Obstruction of the right middle lobe   bronchus with associated partial atelectasis of the right middle lobe.    Indeterminate 2.5 x 1.9 cm left adrenal nodule. MRI can be obtained for   further evaluation.    Fluid noted within the mid and distal esophagus, correlate for aspiration   risk.    Diffuse hepatic steatosis.      U/S neck soft tissue  IMPRESSION:  Questionable 2 cm lesion in the left thyroid fossa which is of uncertain   etiology; recommend 6 month follow-up ultrasound or CT.    V. doppler LLE- DVT in LLE    MRI HEAD- No acute intracranial hemorrhage or acute infarct.    MRI L-SPINE- No acute compression fracture, subluxation or cord compression.    Multilevel degenerative changes without significant spinal canal   stenosis. Severe bilateral neural foraminal narrowing at L5-S1.    Small presacral edema/fluid. Nonspecific nonspecific nodular signals in   the presacral region.    Consultant(s) Notes Reviewed:  [x ] YES     Care Discussed with [x ] Consultants [X ] Patient [ ] Family  [x ]    [x ]  Other; RN

## 2017-03-05 NOTE — CONSULT NOTE ADULT - CONSULT REQUESTED DATE/TIME
02-Mar-2017 13:43
01-Mar-2017 17:52
01-Mar-2017 20:39
01-Mar-2017 20:40
02-Mar-2017 10:00
04-Mar-2017 18:59

## 2017-03-06 LAB
ANION GAP SERPL CALC-SCNC: 7 MMOL/L — SIGNIFICANT CHANGE UP (ref 5–17)
BASOPHILS # BLD AUTO: 0.1 K/UL — SIGNIFICANT CHANGE UP (ref 0–0.2)
BASOPHILS NFR BLD AUTO: 0.7 % — SIGNIFICANT CHANGE UP (ref 0–2)
BUN SERPL-MCNC: 12 MG/DL — SIGNIFICANT CHANGE UP (ref 7–23)
CALCIUM SERPL-MCNC: 7.1 MG/DL — LOW (ref 8.5–10.1)
CHLORIDE SERPL-SCNC: 105 MMOL/L — SIGNIFICANT CHANGE UP (ref 96–108)
CO2 SERPL-SCNC: 28 MMOL/L — SIGNIFICANT CHANGE UP (ref 22–31)
CREAT SERPL-MCNC: 0.43 MG/DL — LOW (ref 0.5–1.3)
EOSINOPHIL # BLD AUTO: 0.2 K/UL — SIGNIFICANT CHANGE UP (ref 0–0.5)
EOSINOPHIL NFR BLD AUTO: 1.7 % — SIGNIFICANT CHANGE UP (ref 0–6)
GLUCOSE SERPL-MCNC: 83 MG/DL — SIGNIFICANT CHANGE UP (ref 70–99)
HCT VFR BLD CALC: 32.8 % — LOW (ref 34.5–45)
HGB BLD-MCNC: 11.3 G/DL — LOW (ref 11.5–15.5)
LYMPHOCYTES # BLD AUTO: 1.5 K/UL — SIGNIFICANT CHANGE UP (ref 1–3.3)
LYMPHOCYTES # BLD AUTO: 13.1 % — SIGNIFICANT CHANGE UP (ref 13–44)
MAGNESIUM SERPL-MCNC: 1.9 MG/DL — SIGNIFICANT CHANGE UP (ref 1.8–2.4)
MCHC RBC-ENTMCNC: 33 PG — SIGNIFICANT CHANGE UP (ref 27–34)
MCHC RBC-ENTMCNC: 34.6 GM/DL — SIGNIFICANT CHANGE UP (ref 32–36)
MCV RBC AUTO: 95.4 FL — SIGNIFICANT CHANGE UP (ref 80–100)
MONOCYTES # BLD AUTO: 0.5 K/UL — SIGNIFICANT CHANGE UP (ref 0–0.9)
MONOCYTES NFR BLD AUTO: 4.3 % — SIGNIFICANT CHANGE UP (ref 2–14)
NEUTROPHILS # BLD AUTO: 9.4 K/UL — HIGH (ref 1.8–7.4)
NEUTROPHILS NFR BLD AUTO: 80.2 % — HIGH (ref 43–77)
PHOSPHATE SERPL-MCNC: 2.2 MG/DL — LOW (ref 2.5–4.5)
PLATELET # BLD AUTO: 296 K/UL — SIGNIFICANT CHANGE UP (ref 150–400)
POTASSIUM SERPL-MCNC: 3.5 MMOL/L — SIGNIFICANT CHANGE UP (ref 3.5–5.3)
POTASSIUM SERPL-SCNC: 3.5 MMOL/L — SIGNIFICANT CHANGE UP (ref 3.5–5.3)
RBC # BLD: 3.43 M/UL — LOW (ref 3.8–5.2)
RBC # FLD: 13.7 % — SIGNIFICANT CHANGE UP (ref 11–15)
SODIUM SERPL-SCNC: 140 MMOL/L — SIGNIFICANT CHANGE UP (ref 135–145)
SURGICAL PATHOLOGY FINAL REPORT - CH: SIGNIFICANT CHANGE UP
THYROGLOB AB FLD IA-ACNC: 52 IU/ML — HIGH (ref 0–40)
THYROGLOB AB SERPL-ACNC: 52 IU/ML — HIGH (ref 0–40)
WBC # BLD: 11.7 K/UL — HIGH (ref 3.8–10.5)
WBC # FLD AUTO: 11.7 K/UL — HIGH (ref 3.8–10.5)

## 2017-03-06 PROCEDURE — 99233 SBSQ HOSP IP/OBS HIGH 50: CPT

## 2017-03-06 PROCEDURE — 99497 ADVNCD CARE PLAN 30 MIN: CPT

## 2017-03-06 PROCEDURE — 71010: CPT | Mod: 26

## 2017-03-06 RX ORDER — PANTOPRAZOLE SODIUM 20 MG/1
40 TABLET, DELAYED RELEASE ORAL
Qty: 0 | Refills: 0 | Status: DISCONTINUED | OUTPATIENT
Start: 2017-03-06 | End: 2017-03-08

## 2017-03-06 RX ORDER — SODIUM,POTASSIUM PHOSPHATES 278-250MG
1 POWDER IN PACKET (EA) ORAL
Qty: 0 | Refills: 0 | Status: COMPLETED | OUTPATIENT
Start: 2017-03-06 | End: 2017-03-07

## 2017-03-06 RX ORDER — PIPERACILLIN AND TAZOBACTAM 4; .5 G/20ML; G/20ML
3.38 INJECTION, POWDER, LYOPHILIZED, FOR SOLUTION INTRAVENOUS EVERY 8 HOURS
Qty: 0 | Refills: 0 | Status: DISCONTINUED | OUTPATIENT
Start: 2017-03-06 | End: 2017-03-08

## 2017-03-06 RX ADMIN — Medication 200 MICROGRAM(S): at 06:04

## 2017-03-06 RX ADMIN — Medication 1 SPRAY(S): at 06:05

## 2017-03-06 RX ADMIN — RIVAROXABAN 15 MILLIGRAM(S): KIT at 11:36

## 2017-03-06 RX ADMIN — Medication 1 TABLET(S): at 17:52

## 2017-03-06 RX ADMIN — RIVAROXABAN 15 MILLIGRAM(S): KIT at 22:08

## 2017-03-06 RX ADMIN — Medication 1 GRAM(S): at 06:03

## 2017-03-06 RX ADMIN — MAGNESIUM OXIDE 400 MG ORAL TABLET 400 MILLIGRAM(S): 241.3 TABLET ORAL at 08:35

## 2017-03-06 RX ADMIN — AMLODIPINE BESYLATE 10 MILLIGRAM(S): 2.5 TABLET ORAL at 06:05

## 2017-03-06 RX ADMIN — Medication 1 TABLET(S): at 11:35

## 2017-03-06 RX ADMIN — MAGNESIUM OXIDE 400 MG ORAL TABLET 400 MILLIGRAM(S): 241.3 TABLET ORAL at 17:52

## 2017-03-06 RX ADMIN — Medication 0.5 MILLIGRAM(S): at 06:04

## 2017-03-06 RX ADMIN — GABAPENTIN 600 MILLIGRAM(S): 400 CAPSULE ORAL at 06:04

## 2017-03-06 RX ADMIN — MAGNESIUM OXIDE 400 MG ORAL TABLET 400 MILLIGRAM(S): 241.3 TABLET ORAL at 11:36

## 2017-03-06 RX ADMIN — PIPERACILLIN AND TAZOBACTAM 25 GRAM(S): 4; .5 INJECTION, POWDER, LYOPHILIZED, FOR SOLUTION INTRAVENOUS at 06:03

## 2017-03-06 RX ADMIN — Medication 0.5 MILLIGRAM(S): at 17:52

## 2017-03-06 RX ADMIN — Medication 1 TABLET(S): at 22:08

## 2017-03-06 RX ADMIN — GABAPENTIN 600 MILLIGRAM(S): 400 CAPSULE ORAL at 22:09

## 2017-03-06 RX ADMIN — PIPERACILLIN AND TAZOBACTAM 25 GRAM(S): 4; .5 INJECTION, POWDER, LYOPHILIZED, FOR SOLUTION INTRAVENOUS at 17:52

## 2017-03-06 RX ADMIN — Medication 1 SPRAY(S): at 22:09

## 2017-03-06 NOTE — PROGRESS NOTE ADULT - SUBJECTIVE AND OBJECTIVE BOX
INTERVAL HISTORY:    Cough.      MEDICATIONS  (STANDING):  influenza   Vaccine 0.5milliLiter(s) IntraMuscular once  gabapentin 600milliGRAM(s) Oral three times a day  LORazepam     Tablet 0.5milliGRAM(s) Oral three times a day  amLODIPine   Tablet 10milliGRAM(s) Oral daily  levothyroxine 200MICROGram(s) Oral daily  rivaroxaban 15milliGRAM(s) Oral <User Schedule>  magnesium oxide 400milliGRAM(s) Oral three times a day with meals  fluticasone propionate 50 MICROgram(s)/spray Nasal Spray 1Spray(s) Both Nostrils two times a day  potassium acid phosphate/sodium acid phosphate tablet (K-PHOS No. 2) 1Tablet(s) Oral four times a day with meals  pantoprazole    Tablet 40milliGRAM(s) Oral before breakfast    MEDICATIONS  (PRN):  oxyCODONE  5 mG/acetaminophen 325 mG 1Tablet(s) Oral every 6 hours PRN Severe Pain (7 - 10)  acetaminophen   Tablet 650milliGRAM(s) Oral every 6 hours PRN For Temp greater than 38 C (100.4 F)  guaiFENesin    Syrup 200milliGRAM(s) Oral every 6 hours PRN Cough  benzonatate 100milliGRAM(s) Oral three times a day PRN Cough      Vital Signs Last 24 Hrs  T(C): 37.7, Max: 37.7 (03-06 @ 00:50)  T(F): 99.8, Max: 99.8 (03-06 @ 00:50)  HR: 96 (96 - 100)  BP: 129/68 (111/59 - 129/68)  BP(mean): --  RR: 18 (18 - 18)  SpO2: 96% (96% - 98%)    PHYSICAL EXAM:    GENERAL: NAD,   HEAD:  Atraumatic, Normocephalic  EYES: EOMI, PERRLA, conjunctiva and sclera clear    NECK: Supple, No JVD, Normal thyroid    CHEST/LUNG: Clear to percussion bilaterally;   HEART: Regular rate and rhythm;   ABDOMEN: Soft, Nontender.  EXTREMITIES:   edema:- left leg.  LYMPH: No lymphadenopathy noted        LABS:                        11.3   11.7  )-----------( 296      ( 06 Mar 2017 07:29 )             32.8     06 Mar 2017 07:29    140    |  105    |  12     ----------------------------<  83     3.5     |  28     |  0.43     Ca    7.1        06 Mar 2017 07:29  Phos  2.2       06 Mar 2017 07:29  Mg     1.9       06 Mar 2017 07:29              RADIOLOGY & ADDITIONAL STUDIES:    PATHOLOGY:

## 2017-03-06 NOTE — PROGRESS NOTE ADULT - SUBJECTIVE AND OBJECTIVE BOX
Initial Consultaion Note  Requested by Name: Dr. Davis  Date/ Time: 3/6/17  Reason for referral/ Consultation: Goals of care conversation    HPI:  61 year old woman with PMH HTN, history of stomach and thyroid cancer (neither active as per family) spinal lesion (unclear etiology, followed q 1 month with MRI as per family) and paraplegia presents with complaint of b/l leg swelling for unknown amount of time.  Family states that they noticed the patient's feet were swollen for the last few weeks and advised her to see her doctor but the patient had trouble leaving her home due to her paralysis.  She also complains of cough with pleuritic chest pain x 1 week.  She denies fever, chills, wheezing, SOB.      LE US positive for b/l DVT.     PAST MEDICAL & SURGICAL HISTORY:  GERD (gastroesophageal reflux disease)  Thyroid cancer  Stomach cancer  HTN (hypertension)  No significant past surgical history    SOCIAL HISTORY: smoker [x ] past smoker [ ] non smoker[ ]                                  no alcohol [ ] social alcohol [ ] alcohol [ ]                                  Admitted from: home [x ] SNF [ ] [facility] [ ] [facility] [ ]    Surrogate/HCP/Guardian: [ x] YES [ ] NO. Name/ Phone#: Jamila Newman (Daughter/HCP)  Significant other/partner:                     Children:                         Nondenominational/Spirituality:    FAMILY HISTORY:  No pertinent family history in first degree relatives    Baseline ADLs (Prior to admission)  Independent [ ]  Moderately [ ] fully [ ]  Dependent [ ] moderately [X ] fully [ ]    ADVANCE DIRECTIVES:  [ ] YES [X ] NO   DNR [ ] YES [X ] NO  Completed on:                     MOLST  [ ] YES [X ] NO   Completed on:  Living Will  [ ] YES [X ] NO   Completed on:    Allergies    itchy throat  KIWI (Other)  No Known Drug Allergies    Intolerances    MEDICATIONS  (STANDING):  influenza   Vaccine 0.5milliLiter(s) IntraMuscular once  gabapentin 600milliGRAM(s) Oral three times a day  LORazepam     Tablet 0.5milliGRAM(s) Oral three times a day  amLODIPine   Tablet 10milliGRAM(s) Oral daily  levothyroxine 200MICROGram(s) Oral daily  rivaroxaban 15milliGRAM(s) Oral <User Schedule>  magnesium oxide 400milliGRAM(s) Oral three times a day with meals  fluticasone propionate 50 MICROgram(s)/spray Nasal Spray 1Spray(s) Both Nostrils two times a day  potassium acid phosphate/sodium acid phosphate tablet (K-PHOS No. 2) 1Tablet(s) Oral four times a day with meals  pantoprazole    Tablet 40milliGRAM(s) Oral before breakfast  piperacillin/tazobactam IVPB. 3.375Gram(s) IV Intermittent every 8 hours    MEDICATIONS  (PRN):  oxyCODONE  5 mG/acetaminophen 325 mG 1Tablet(s) Oral every 6 hours PRN Severe Pain (7 - 10)  acetaminophen   Tablet 650milliGRAM(s) Oral every 6 hours PRN For Temp greater than 38 C (100.4 F)  guaiFENesin    Syrup 200milliGRAM(s) Oral every 6 hours PRN Cough  benzonatate 100milliGRAM(s) Oral three times a day PRN Cough    OPIATE NAIVE: (Y/N)  I STOP REVIEWED: (Y/N) : (#-------------------)   Review of Systems:     PAIN : (Y)  PAIN SITE : Bilateral lower extrimity  QUALITY/QUANTITY OF PAIN : dull  PAIN RADIATING :( Y)  SEVERITY : 7/10  FREQUENCY : constant  IMPACT ON ADLs : total dependence     DYSPNEA: (Y/N) Mild [ ] Moderate [x ] Severe [ ]  NAUSEA/VOMITING: (N)  DEPRESSION: (Y/N) Mild [ ]Moderate [x ] Severe [ ]  ANXIETY: (Y)  AGITATION: (N):  SECRETIONS:(Y)  CONSTIPATION : (N)  DIARRHEA : (N)  FRAILTY SYNDROME (Y):  FAILURE TO THRIVE (N):  DEBILITY (Y);  OTHER SYMPTOMS :  UNABLE TO OBTAIN DUE TO POOR MENTATION (   )    PHYSICAL EXAM:  T(F): 98.8, Max: 99.8 (03-06 @ 00:50)  HR: 94 (94 - 100)  BP: 112/68 (111/59 - 129/68)  RR: 18 (18 - 18)  SpO2: 96% (95% - 98%)  Wt(kg): --   WEIGHT :                      BMI:  I&O's Summary  I & Os for 24h ending 06 Mar 2017 07:00  =============================================  IN: 840 ml / OUT: 0 ml / NET: 840 ml    I & Os for current day (as of 06 Mar 2017 16:39)  =============================================  IN: 120 ml / OUT: 0 ml / NET: 120 ml    CAPILLARY BLOOD GLUCOSE      GENERAL: alert [x ] oriented x __3____[x ] lethargic [x ] agitated [ ] cachexia [ ] nonverbal [ ] coma [ ]  HEENT: normal [x ] dry mouth [ ] ET tube/trach [ ]   LUNGS: Comfortable [ ] tachypnea/ labored breathing[x ] excessive secretions [x ]  CV :  normal [ ] tachycardia [x ]  GI : normal [x ] distended [ ] tender [ ] no BS [ ]  PEG /NG tube [ ]   : normal [x ] incontinent [ ] oliguria/anuria [ ] burgos [ ]  MSK : normal [ ] weakness [ ] edema [ ]              ambulatory [ ] bebbound/wheelchair bound [x ]   SKIN : normal [x ] pressure ulcer (Y/N) Stage ______________ rash (Y/N)    Functional Assessment:Karnofsky Performance Score : <30  Palliative Performance Status Version 2:         %    LABS:                        11.3   11.7  )-----------( 296      ( 06 Mar 2017 07:29 )             32.8     06 Mar 2017 07:29    140    |  105    |  12     ----------------------------<  83     3.5     |  28     |  0.43     Ca    7.1        06 Mar 2017 07:29  Phos  2.2       06 Mar 2017 07:29  Mg     1.9       06 Mar 2017 07:29    I&O's Detail  I & Os for 24h ending 06 Mar 2017 07:00  =============================================  IN:    Oral Fluid: 840 ml    Total IN: 840 ml  ---------------------------------------------  OUT:    Total OUT: 0 ml  ---------------------------------------------  Total NET: 840 ml    I & Os for current day (as of 06 Mar 2017 16:39)  =============================================  IN:    Oral Fluid: 120 ml    Total IN: 120 ml  ---------------------------------------------  OUT:    Total OUT: 0 ml  ---------------------------------------------  Total NET: 120 ml      Assessment:   61y Female admitted with ACUTE DEEP VEIN THROMBOSIS LOWER EXTREMITY  PT FELL    PROBLEM LIST :  PROBLEM/RECOMMENDATION: 1  Problem : Goals of care, counseling/ discussion.  Recommendation: met with patient and called daughter/HCP and discussed GOC & Advanced care planning                                    Palliative care info/counseling provided                                       Advanced Directives addressed.   PROBLEM/ RECOMMENDATION:2  Problem :Resuscitation/ DNR/ DNI,   Recommendation: DNR/ DNI    PROBLEM/ RECOMMENDATION :3  Problem : Medical order for life sustaning treatment  Recommendation : MOLST Form ( initiated/ completed)    PROBLEM/RECOMMENDATION :4  Problem : Advanced care planning  Recommendation : Family meeting. Spoke to daughter in detail on advanced care planning. As per daughter they would like to send patient for rehab and the for PET SCAN and continue aggressive treatment, including full code. Not ready for Goals of care and advanced care planning at this time.     PLAN:  REFFERALS:  Hospice (Y/N)                         Palliative Med  (Y/N)                         Unit SW/Case Mgmt (Y/N)                          (Y/N)                         Speech/Swallow (Y/N)                         Patient/Family Support (Y/N)                         Massage Therapy (Y/N)                         Pain Management (Y/N)                         Nutrition (Y/N)                         Ethics (Y/N)                         PT/OT (Y/N)

## 2017-03-06 NOTE — PROGRESS NOTE ADULT - PROBLEM SELECTOR PLAN 7
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 primary neurologist dr. Brandt  -Neuro following up, MRI brain, LS spine noted.  -cont on PT/OT  -will need [facility] upon DC as per PT eval.

## 2017-03-06 NOTE — GOALS OF CARE CONVERSATION - PERSONAL ADVANCE DIRECTIVE - CONVERSATION DETAILS
61 year old woman with PMH HTN, history of stomach and thyroid cancer (neither active as per family) spinal lesion (unclear etiology, followed q 1 month with MRI as per family) and paraplegia presents with complaint of b/l leg swelling for unknown amount of time. She also complains of cough with pleuritic chest pain x 1 week.  She denies fever, chills, wheezing, SOB. LE US positive for b/l DVT.

## 2017-03-06 NOTE — PROGRESS NOTE ADULT - SUBJECTIVE AND OBJECTIVE BOX
CHIEF COMPLAINT/INTERVAL HISTORY:    Patient is a 61y old  Female who presents with a chief complaint of Leg swelling (01 Mar 2017 00:23)      HPI:  61 year old woman with PMH HTN, history of stomach and thyroid cancer (neither active as per family) spinal lesion (unclear etiology, followed q 1 month with MRI as per family) and paraplegia presents with complaint of b/l leg swelling for unknown amount of time.  Family states that they noticed the patient's feet were swollen for the last few weeks and advised her to see her doctor but the patient had trouble leaving her home due to her paralysis.  She also complains of cough with pleuritic chest pain x 1 week.  She denies fever, chills, wheezing, SOB.      LE US positive for b/l DVT. (01 Mar 2017 00:23)      SUBJECTIVE & OBJECTIVE: Pt seen and examined at bedside. c/o frequent BM< as per RN no diarrhea, only frequent BMs after taking carafate as per pt. not on carafate on daily basis at home, takes it prn, + post nasal drip  Denies chest pain/SOB, nausea/vomiting/abd. pain, no dizziness, HA or blurry vision, all other ROS negative.      Vital Signs Last 24 Hrs  T(C): 37.1, Max: 37.7 (03-06 @ 00:50)  T(F): 98.8, Max: 99.8 (03-06 @ 00:50)  HR: 94 (94 - 100)  BP: 112/68 (111/59 - 129/68)  BP(mean): --  ABP: --  ABP(mean): --  RR: 18 (18 - 18)  SpO2: 96% (95% - 98%)        MEDICATIONS  (STANDING):  influenza   Vaccine 0.5milliLiter(s) IntraMuscular once  gabapentin 600milliGRAM(s) Oral three times a day  LORazepam     Tablet 0.5milliGRAM(s) Oral three times a day  amLODIPine   Tablet 10milliGRAM(s) Oral daily  levothyroxine 200MICROGram(s) Oral daily  rivaroxaban 15milliGRAM(s) Oral <User Schedule>  magnesium oxide 400milliGRAM(s) Oral three times a day with meals  fluticasone propionate 50 MICROgram(s)/spray Nasal Spray 1Spray(s) Both Nostrils two times a day  potassium acid phosphate/sodium acid phosphate tablet (K-PHOS No. 2) 1Tablet(s) Oral four times a day with meals  pantoprazole    Tablet 40milliGRAM(s) Oral before breakfast  piperacillin/tazobactam IVPB. 3.375Gram(s) IV Intermittent every 8 hours    MEDICATIONS  (PRN):  oxyCODONE  5 mG/acetaminophen 325 mG 1Tablet(s) Oral every 6 hours PRN Severe Pain (7 - 10)  acetaminophen   Tablet 650milliGRAM(s) Oral every 6 hours PRN For Temp greater than 38 C (100.4 F)  guaiFENesin    Syrup 200milliGRAM(s) Oral every 6 hours PRN Cough  benzonatate 100milliGRAM(s) Oral three times a day PRN Cough        PHYSICAL EXAM:    GENERAL: NAD, well developed  HEAD:  Atraumatic, Normocephalic  EYES: EOMI, PERRLA, conjunctiva and sclera clear, + pallor  ENMT: Moist mucous membranes  NECK: Supple, No JVD  NERVOUS SYSTEM:  Alert & Oriented X3, cranial nerves intact  CHEST/LUNG: Clear to auscultation bilaterally; No rales, rhonchi, wheezing, or rubs  HEART: Regular rate and rhythm;  ABDOMEN: Soft, Nontender, Nondistended; Bowel sounds present  EXTREMITIES:  no cyanosis, LLE swollen    LABS:                        11.3   11.7  )-----------( 296      ( 06 Mar 2017 07:29 )             32.8     06 Mar 2017 07:29    140    |  105    |  12     ----------------------------<  83     3.5     |  28     |  0.43     Ca    7.1        06 Mar 2017 07:29  Phos  2.2       06 Mar 2017 07:29  Mg     1.9       06 Mar 2017 07:29      Surgical Pathology Final Report - CH:     Surgical Final Report      Final Diagnosis  Lung, right middle lobe, biopsy #1 & #2:  - Poorly differentiated non small carcinoma  (see comment)    Intraoperative Consultation  Lung, right middle lobe, biopsy:  - Non small cell carcinoma    Dr. Wall notified.    Comment  Immunostains is in progress for further characterization of the  tumor . An addendum report will follow.

## 2017-03-06 NOTE — PROGRESS NOTE ADULT - ASSESSMENT
61 young w paraplegia  hx of cancer in distant past  family wants to pursue all intervention  and rehave  unable to assess prognosis due to lack of information

## 2017-03-06 NOTE — PROGRESS NOTE ADULT - SUBJECTIVE AND OBJECTIVE BOX
Patient is a 61y old  Female who presents with a chief complaint of Leg swelling (01 Mar 2017 00:23)      INTERVAL HPI/OVERNIGHT EVENTS:  pt with no complaints at this time    MEDICATIONS  (STANDING):  influenza   Vaccine 0.5milliLiter(s) IntraMuscular once  gabapentin 600milliGRAM(s) Oral three times a day  LORazepam     Tablet 0.5milliGRAM(s) Oral three times a day  amLODIPine   Tablet 10milliGRAM(s) Oral daily  levothyroxine 200MICROGram(s) Oral daily  piperacillin/tazobactam IVPB. 3.375Gram(s) IV Intermittent every 8 hours  rivaroxaban 15milliGRAM(s) Oral <User Schedule>  magnesium oxide 400milliGRAM(s) Oral three times a day with meals  fluticasone propionate 50 MICROgram(s)/spray Nasal Spray 1Spray(s) Both Nostrils two times a day  potassium acid phosphate/sodium acid phosphate tablet (K-PHOS No. 2) 1Tablet(s) Oral four times a day with meals  pantoprazole    Tablet 40milliGRAM(s) Oral before breakfast    MEDICATIONS  (PRN):  oxyCODONE  5 mG/acetaminophen 325 mG 1Tablet(s) Oral every 6 hours PRN Severe Pain (7 - 10)  acetaminophen   Tablet 650milliGRAM(s) Oral every 6 hours PRN For Temp greater than 38 C (100.4 F)  guaiFENesin    Syrup 200milliGRAM(s) Oral every 6 hours PRN Cough  benzonatate 100milliGRAM(s) Oral three times a day PRN Cough      REVIEW OF SYSTEMS:  CONSTITUTIONAL: No fever, weight loss, or fatigue  RESPIRATORY: No cough, wheezing, chills or hemoptysis; No shortness of breath  CARDIOVASCULAR: No chest pain, palpitations, dizziness, or leg swelling  GASTROINTESTINAL: No abdominal or epigastric pain. No nausea, vomiting, or hematemesis; No diarrhea or constipation. No melena or hematochezia.  ENDOCRINE: No heat or cold intolerance; No hair loss      Vital Signs Last 24 Hrs  T(C): 37.7, Max: 37.7 (03-06 @ 00:50)  T(F): 99.8, Max: 99.8 (03-06 @ 00:50)  HR: 96 (96 - 100)  BP: 129/68 (111/59 - 129/68)  BP(mean): --  RR: 18 (18 - 18)  SpO2: 96% (96% - 98%)    PHYSICAL EXAM:  GENERAL: NAD, well-groomed, well-developed  CHEST/LUNG: Clear to percussion bilaterally; No rales, rhonchi, wheezing, or rubs  HEART: Regular rate and rhythm; No murmurs, rubs, or gallops      LABS:                        11.3   11.7  )-----------( 296      ( 06 Mar 2017 07:29 )             32.8     06 Mar 2017 07:29    140    |  105    |  12     ----------------------------<  83     3.5     |  28     |  0.43     Ca    7.1        06 Mar 2017 07:29  Phos  2.2       06 Mar 2017 07:29  Mg     1.9       06 Mar 2017 07:29          CAPILLARY BLOOD GLUCOSE    Lipid panel:               RADIOLOGY & ADDITIONAL TESTS:

## 2017-03-06 NOTE — GOALS OF CARE CONVERSATION - PERSONAL ADVANCE DIRECTIVE - TREATMENT GUIDELINE COMMENT
Spoke to daughter in detail on advanced care planning. As per daughter they would like to send patient for rehab and the for PET SCAN and continue aggressive treatment, including full code. Not ready for Goals of care and advanced care planning at this time.

## 2017-03-06 NOTE — PROGRESS NOTE ADULT - PROBLEM SELECTOR PLAN 1
possible gram neg oneumonia  cont zosyn (day 3/5)  monitor for fever  possible fever can be sec to DVT and solid tumor with mets too .  Has mild leukocytosis as well today

## 2017-03-06 NOTE — PROGRESS NOTE ADULT - ASSESSMENT
61 year old woman with PMH HTN, history of stomach CA s/p subtotal gastrectomy 5 years ago, and thyroid cancer s/p surgery 17 years ago (neither active as per family)Cervical spinal lesion (unclear etiology, followed q 1 month with MRI as per family) and bilateral LE weakness with neuropathy, with limited mobility. Presented with complaint of  leg swelling for unknown amount of time. Found to have extensive left leg DVT and right lung mass. S/P IVC filter and Bronchoscopy + washing + biopsy on 03/03/17. Surgical pathology c/w NSCLC poorly differentiated, as per hem/onc and CT surg. not operable and PT scan as outpateint, pt. also on IV zosyn for possible pneumonia for gram negative coverage.

## 2017-03-06 NOTE — PROGRESS NOTE ADULT - SUBJECTIVE AND OBJECTIVE BOX
Patient seen and examined at bedside in no distress. Patient is without complaints today. Denies fever, chills, chest pain, shortness of breath, leg pain.     Vital Signs Last 24 Hrs  T(F): 99.8, Max: 99.8 (03-06 @ 00:50)  HR: 96  BP: 129/68  RR: 18  SpO2: 96%    GENERAL: Alert, oriented, NAD  CHEST/LUNG: Clear to auscultation bilaterally, respirations nonlabored  HEART: S1S2, Regular rate and rhythm  EXTREMITIES:  LLE with 2+ pitting edema, + peripheral pulses b/l   LABS:                        11.3   11.7  )-----------( 296      ( 06 Mar 2017 07:29 )             32.8     06 Mar 2017 07:29    140    |  105    |  12     ----------------------------<  83     3.5     |  28     |  0.43     Ca    7.1        06 Mar 2017 07:29  Phos  2.2       06 Mar 2017 07:29  Mg     1.9       06 Mar 2017 07:29    Impression: 61F PMH HTN, stomach CA h/o subtotal gastrectomy, thyroid CA h/o thyroidectomy, paraplegia, POD #3 s/p bronchoscopy with biopsy 2/2 R hilar lung mass & L IVC filter placement 2/2 LLE DVT  Plan:  - continue with xarelto  - f/u results bronch biopsy  - pain management PRN  - incentive spirometry   - c/w PT and OT  - f/u outpatient with heme/onc for PET scan   - continue with all medical management/supportive care   - d/c per medicine   - will discuss with Dr Wall and Dr Joya

## 2017-03-06 NOTE — PROGRESS NOTE ADULT - PROBLEM SELECTOR PLAN 1
-Newly Dx lung mass  -S/P IVC filter and Bronchoscopy + biopsy  -Pulmonary and CT surgeon following, inoperable lung mass, PET scan as outpatient,   palliative care consult to discuss GOC.  -IV zosyn for possible gram neg pna,in setting of fever, fever resolving,  ID following, IV Abx for 5 days

## 2017-03-06 NOTE — PROGRESS NOTE ADULT - SUBJECTIVE AND OBJECTIVE BOX
Initial Consultaion Note  Requested by Name:  Date/ Time:3/6/17  Reason for referral/ Consultation:    HPI:  61 year old woman with PMH HTN, history of stomach and thyroid cancer (neither active as per family) spinal lesion (unclear etiology, followed q 1 month with MRI as per family) and paraplegia presents with complaint of b/l leg swelling for unknown amount of time.  Family states that they noticed the patient's feet were swollen for the last few weeks and advised her to see her doctor but the patient had trouble leaving her home due to her paralysis.  She also complains of cough with pleuritic chest pain x 1 week.  She denies fever, chills, wheezing, SOB.      LE US positive for b/l DVT. (01 Mar 2017 00:23)      PAST MEDICAL & SURGICAL HISTORY:  GERD (gastroesophageal reflux disease)  Thyroid cancer  Stomach cancer  HTN (hypertension)  No significant past surgical history      SOCIAL HISTORY:                                 Admitted from: home [ ] SNF [ ] [facility] [ ] [facility] [ ]    Surrogate/HCP/Guardian: [ ] YES [ ] NO. Name/ Phone#:  Significant other/partner:                     Children:                         Gnosticism/Spirituality:    Baseline ADLs (Prior to admission)    ADVANCE DIRECTIVES:  [ ] YES [ ] NO   DNR [ ] YES [ ] NO  Completed on:                     MOLST  [ ] YES [ ] NO   Completed on:  Living Will  [ ] YES [ ] NO   Completed on:    Allergies    itchy throat  KIWI (Other)  No Known Drug Allergies    Intolerances        Review of Systems:     PAIN : (Y/N) (0-10)  PAIN SITE :  QUALITY/QUANTITY OF PAIN :  PAIN RADIATING :( Y/N)  SEVERITY :  FREQUENCY :  IMPACT ON ADLs :      DYSPNEA: (Y/N) Mild [ ] Moderate [ ] Severe [ ]  NAUSEA/VOMITING: (Y/N)  DEPRESSION: (Y/N) Mild [ ]Moderate [ ] Severe [ ]  ANXIETY: (Y/N)  AGITATION: (Y/N):  SECRETIONS:(Y/N)  CONTIPATION : (Y/N)  DIARRHEA : (Y/N)  FRAILTY SYNDROM (Y/N):  FAILURE TO THRIVE (Y/N):  DIBILITY (Y/N);  OTHER SYMPTOMS :  UNABLE TO OBTAIN DUE TO POOR MENTATION (   )    PHYSICAL EXAM:  T(F): 98.6, Max: 99.8 (03-06 @ 00:50)  HR: 96 (94 - 100)  BP: 115/71 (111/59 - 129/68)  RR: 16 (16 - 18)  SpO2: 95% (95% - 98%)  Wt(kg): --   WEIGHT :    [id number]                  BMI:  I&O's Summary  I & Os for 24h ending 06 Mar 2017 07:00  =============================================  IN: 840 ml / OUT: 0 ml / NET: 840 ml    I & Os for current day (as of 06 Mar 2017 20:06)  =============================================  IN: 360 ml / OUT: 200 ml / NET: 160 ml    CAPILLARY BLOOD GLUCOSE      GENERAL: alert [ ] oriented x ______[ ] lethargic [ ] agitated [ ] cachexia [ ] nonverbal [ ] coma [ ]  HEENT: normal [ ] dry mouth [ ] ET tube/trach [ ]   LUNGS: ]  CV :  normal [ ]  GI : normal [ ]  PEG /NG tube [ ]   : normal [ ] incontinent [ ] oliguria/anuria [ ] burgos [ ]  MSK : normal [ ] weakness [ ] edema [ ]              ambulatory [ ] bebbound/wheelchair bound [ ]   SKIN : normal [ ] pressure ulcer (Y/N) Stage ______________ rash (Y/N)    Functional Assessment:Karnofsky Performance Score :  Palliative Performance Status Version 2:         %    LABS:                        11.3   11.7  )-----------( 296      ( 06 Mar 2017 07:29 )             32.8     06 Mar 2017 07:29    140    |  105    |  12     ----------------------------<  83     3.5     |  28     |  0.43     Ca    7.1        06 Mar 2017 07:29  Phos  2.2       06 Mar 2017 07:29  Mg     1.9       06 Mar 2017 07:29            I&O's Detail  I & Os for 24h ending 06 Mar 2017 07:00  =============================================  IN:    Oral Fluid: 840 ml    Total IN: 840 ml  ---------------------------------------------  OUT:    Total OUT: 0 ml  ---------------------------------------------  Total NET: 840 ml    I & Os for current day (as of 06 Mar 2017 20:06)  =============================================  IN:    Oral Fluid: 360 ml    Total IN: 360 ml  ---------------------------------------------  OUT:    Voided: 200 ml    Total OUT: 200 ml  ---------------------------------------------  Total NET: 160 ml      Assessment:   61y Female admitted with ACUTE DEEP VEIN THROMBOSIS LOWER EXTREMITY  PT FELL      PROBLEM LIST :  PROBLEM/RECOMMENDATION: 1  Problem : Goals of care, counseling/ discussion.  Recommendation: met with patient/ family and discussed GOC & Advanced care planning                                    Palliative care info/counseling provided (Y/N)                                      Family meeting                                   Advanced Directives addressed (Y/N)  PROBLEM/ RECOMMENDATION:2  Problem :Resuscitation/ DNR/ DNI,   Recommendation: DNR/ DNI    PROBLEM/ RECOMMENDATION :3  Problem : Medical order for life sustaning treatment  Recommendation : MOLST Form ( initiated/ completed)    PROBLEM/RECOMMENDATION :4  Problem : Advanced care planning  Recommendation : Family meeting.(Y/N)     PLAN:  REFFERALS:                           Unit SW/Case Mgmt (Y/N)                          (Y/N)                         Speech/Swallow (Y/N)                           nutrition                                              Ethics (Y/N)                         PT/OT (Y/N)

## 2017-03-06 NOTE — PROGRESS NOTE ADULT - PROBLEM SELECTOR PLAN 1
s/p partial thyroidectomy, hypothyroid  continue increased dose of levothyroxine 200mcg daily  check thyroglobulin and thyroglobulin ab, still pending  neck sono showed 0.4h6g4wa left thyroid bed lesion, when compared to 3/26/15 sono from [facility] described as right hemithyroidectomy with left lobe measuring 3.8x1.3x1.1cm, lt "lesion" likely left lobe remnant  will follow as outpt

## 2017-03-06 NOTE — PROGRESS NOTE ADULT - SUBJECTIVE AND OBJECTIVE BOX
INTERVAL HPI/OVERNIGHT EVENTS:  61 year old woman with PMH HTN, history of stomach CA s/p subtotal gastrectomy 5 years ago, and thyroid cancer s/p surgery 17 years ago (neither active as per family)Cervical spinal lesion (unclear etiology, followed q 1 month with MRI as per family) and bilateral LE weakness with neuropathy, with limited mobility. Presented with complaint of b/l leg swelling for unknown amount of time.  Family states that they noticed the patient's feet were swollen for the last few weeks and advised her to see her doctor but the patient had trouble leaving her home due to her LE weakness.  She also complains of cough with pleuritic chest pain x 1 week.  She denies fever, chills, wheezing, SOB.    Found to have extensive right leg DVT and right lung  mass S/P IVC filter and Bronchoscopy on 03/03/17.  Awake and comfortable.    Vital Signs Last 24 Hrs  T(C): 37, Max: 37.7 (03-06 @ 00:50)  T(F): 98.6, Max: 99.8 (03-06 @ 00:50)  HR: 96 (94 - 100)  BP: 115/71 (111/59 - 129/68)  BP(mean): --  RR: 16 (16 - 18)  SpO2: 95% (95% - 98%)    PHYSICAL EXAM:  GEN:         Awake, responsive and comfortable.  HEENT:    Normal.    RESP:   CVS:             Regular rate and rhythm.   ABD:         Soft, non-tender, non-distended;   :             No costovertebral angle tenderness  EXTR:            No clubbing, cyanosis or edema  CNS:              Intact sensory and motor function.    MEDICATIONS  (STANDING):  influenza   Vaccine 0.5milliLiter(s) IntraMuscular once  gabapentin 600milliGRAM(s) Oral three times a day  LORazepam     Tablet 0.5milliGRAM(s) Oral three times a day  amLODIPine   Tablet 10milliGRAM(s) Oral daily  levothyroxine 200MICROGram(s) Oral daily  rivaroxaban 15milliGRAM(s) Oral <User Schedule>  magnesium oxide 400milliGRAM(s) Oral three times a day with meals  fluticasone propionate 50 MICROgram(s)/spray Nasal Spray 1Spray(s) Both Nostrils two times a day  potassium acid phosphate/sodium acid phosphate tablet (K-PHOS No. 2) 1Tablet(s) Oral four times a day with meals  pantoprazole    Tablet 40milliGRAM(s) Oral before breakfast  piperacillin/tazobactam IVPB. 3.375Gram(s) IV Intermittent every 8 hours    MEDICATIONS  (PRN):  oxyCODONE  5 mG/acetaminophen 325 mG 1Tablet(s) Oral every 6 hours PRN Severe Pain (7 - 10)  acetaminophen   Tablet 650milliGRAM(s) Oral every 6 hours PRN For Temp greater than 38 C (100.4 F)  guaiFENesin    Syrup 200milliGRAM(s) Oral every 6 hours PRN Cough  benzonatate 100milliGRAM(s) Oral three times a day PRN Cough    LABS:                        11.3   11.7  )-----------( 296      ( 06 Mar 2017 07:29 )             32.8     06 Mar 2017 07:29    140    |  105    |  12     ----------------------------<  83     3.5     |  28     |  0.43     Ca    7.1        06 Mar 2017 07:29  Phos  2.2       06 Mar 2017 07:29  Mg     1.9       06 Mar 2017 07:29    ASSESSMENT AND PLAN:  ·	Right lung mass. S/P Bronchoscopy on 03/03/17.  ·	Left leg DVT ( greater saphenous popliteal, femoral)  ·	Pleural effusion.  ·	Gastric CA history.  ·	Thyroid CA history.  ·	HTN.  ·	Anemia.  ·	Hypokalemia.  ·	Fever.  ·	Cough    On antibiotics.  Follow biopsy results.

## 2017-03-06 NOTE — PROGRESS NOTE ADULT - SUBJECTIVE AND OBJECTIVE BOX
Patient is a 61y old  Female who presents with a chief complaint of Leg swelling (01 Mar 2017 00:23)      INTERVAL HPI / OVERNIGHT EVENTS: doing ok ,mild shortness of breath, no cough or chest pain ,no fever    MEDICATIONS  (STANDING):  influenza   Vaccine 0.5milliLiter(s) IntraMuscular once  gabapentin 600milliGRAM(s) Oral three times a day  LORazepam     Tablet 0.5milliGRAM(s) Oral three times a day  amLODIPine   Tablet 10milliGRAM(s) Oral daily  levothyroxine 200MICROGram(s) Oral daily  rivaroxaban 15milliGRAM(s) Oral <User Schedule>  magnesium oxide 400milliGRAM(s) Oral three times a day with meals  fluticasone propionate 50 MICROgram(s)/spray Nasal Spray 1Spray(s) Both Nostrils two times a day  potassium acid phosphate/sodium acid phosphate tablet (K-PHOS No. 2) 1Tablet(s) Oral four times a day with meals  pantoprazole    Tablet 40milliGRAM(s) Oral before breakfast  piperacillin/tazobactam IVPB. 3.375Gram(s) IV Intermittent every 8 hours    MEDICATIONS  (PRN):  oxyCODONE  5 mG/acetaminophen 325 mG 1Tablet(s) Oral every 6 hours PRN Severe Pain (7 - 10)  acetaminophen   Tablet 650milliGRAM(s) Oral every 6 hours PRN For Temp greater than 38 C (100.4 F)  guaiFENesin    Syrup 200milliGRAM(s) Oral every 6 hours PRN Cough  benzonatate 100milliGRAM(s) Oral three times a day PRN Cough      Vital Signs Last 24 Hrs  T(C): 37.1, Max: 37.7 (03-06 @ 00:50)  T(F): 98.8, Max: 99.8 (03-06 @ 00:50)  HR: 94 (94 - 100)  BP: 112/68 (111/59 - 129/68)  BP(mean): --  RR: 18 (18 - 18)  SpO2: 96% (95% - 98%)    PHYSICAL EXAM:    Constitutional: NAD, well-groomed, well-developed  Respiratory: CTAB/L  Cardiovascular: S1 and S2, RRR, no M/G/R  Gastrointestinal: BS+, soft, NT/ND  Extremities: LLE edema  Vascular: 2+ peripheral pulses  Skin: No rashes      LABS:                        11.3   11.7  )-----------( 296      ( 06 Mar 2017 07:29 )             32.8     06 Mar 2017 07:29    140    |  105    |  12     ----------------------------<  83     3.5     |  28     |  0.43     Ca    7.1        06 Mar 2017 07:29  Phos  2.2       06 Mar 2017 07:29  Mg     1.9       06 Mar 2017 07:29              MICROBIOLOGY:  RECENT CULTURES:  03-05 .Blood Blood-Peripheral XXXX XXXX   No growth to date.          RADIOLOGY & ADDITIONAL STUDIES:

## 2017-03-06 NOTE — PROGRESS NOTE ADULT - SUBJECTIVE AND OBJECTIVE BOX
SERVICE - THORACIC    INTERVAL HPI/OVERNIGHT EVENTS:    61 YEAR OLD FEMALE PT ADMITTED EMERGENTLY WITH SEVERE SWELLING OF THE LEFT LEG    Found to have extensive right leg DVT and right lung  mass S/P IVC filter and Bronchoscopy on 03/03/17.    She still has swelling of the left leg.    Reports having cough, has underlying Acid reflex and now feels having post nasal drip.           Subjective:    Vital Signs:  Vital Signs Last 24 Hrs  T(C): 37.1, Max: 37.7 (03-06 @ 00:50)  T(F): 98.8, Max: 99.8 (03-06 @ 00:50)  HR: 94 (94 - 100)  BP: 112/68 (111/59 - 129/68)  RR: 18 (18 - 18)  SpO2: 96% (95% - 98%)  Wt(kg): -- on (O2)    Telemetry/Alarms:    Medications  influenza   Vaccine 0.5milliLiter(s) IntraMuscular once  oxyCODONE  5 mG/acetaminophen 325 mG 1Tablet(s) Oral every 6 hours PRN  gabapentin 600milliGRAM(s) Oral three times a day  LORazepam     Tablet 0.5milliGRAM(s) Oral three times a day  amLODIPine   Tablet 10milliGRAM(s) Oral daily  levothyroxine 200MICROGram(s) Oral daily  acetaminophen   Tablet 650milliGRAM(s) Oral every 6 hours PRN  guaiFENesin    Syrup 200milliGRAM(s) Oral every 6 hours PRN  benzonatate 100milliGRAM(s) Oral three times a day PRN  rivaroxaban 15milliGRAM(s) Oral <User Schedule>  magnesium oxide 400milliGRAM(s) Oral three times a day with meals  fluticasone propionate 50 MICROgram(s)/spray Nasal Spray 1Spray(s) Both Nostrils two times a day  potassium acid phosphate/sodium acid phosphate tablet (K-PHOS No. 2) 1Tablet(s) Oral four times a day with meals  pantoprazole    Tablet 40milliGRAM(s) Oral before breakfast  piperacillin/tazobactam IVPB. 3.375Gram(s) IV Intermittent every 8 hours      Physical Exam  General: WN/WD NAD  Neurology: A&Ox3, nonfocal, HERNANDEZ x 4  Respiratory: CTA B/L  CV: RRR, S1S2, no murmurs, rubs or gallops  Abdominal: Soft, NT, ND +BS, Last BM  Extremities: No edema, + peripheral pulses  Incisions: MSI Healing Well, Sternum Stable  Tubes/Wires:    I & Os for 24h ending 03-06 @ 07:00  =============================================  IN:    Oral Fluid: 840 ml    Total IN: 840 ml  ---------------------------------------------  OUT:    Total OUT: 0 ml  ---------------------------------------------  Total NET: 840 ml    I & Os for current day (as of 03-06 @ 18:20)  =============================================  IN:    Oral Fluid: 120 ml    Total IN: 120 ml  ---------------------------------------------  OUT:    Total OUT: 0 ml  ---------------------------------------------  Total NET: 120 ml      Labs  06 Mar 2017 07:29    140    |  105    |  12     ----------------------------<  83     3.5     |  28     |  0.43     Ca    7.1        06 Mar 2017 07:29  Phos  2.2       06 Mar 2017 07:29  Mg     1.9       06 Mar 2017 07:29                            11.3   11.7  )-----------( 296      ( 06 Mar 2017 07:29 )             32.8       Today's CXR:    PAST MEDICAL & SURGICAL HISTORY:  GERD (gastroesophageal reflux disease)  Thyroid cancer  Stomach cancer  HTN (hypertension)  No significant past surgical history      Assessment  61y Female admitted with complaints of Patient is a 61y old  Female who presents with a chief complaint of Leg swelling (01 Mar 2017 00:23)  .  ASSESSMENT AND PLAN:  ·	Right lung mass. S/P Bronchoscopy on 03/03/17.  ·	Left leg DVT ( greater saphenous popliteal, femoral)  ·	Pleural effusion.  ·	Gastric CA history.  ·	Thyroid CA history.  ·	HTN.  ·	Anemia.  ·	Hypokalemia.  ·	Fever.          PLAN    XERALTO  Physical Therapy  OOB  Encourage coughing, deep breathing and use of incentive spirometry. Wean off supplemental oxygen as able. Daily CXR.   Cardio: Monitor telemetry/alarms  GI: Tolerating diet. Continue stool softeners.  Heme: Stable H/H. Trend CBC daily.

## 2017-03-06 NOTE — PROGRESS NOTE ADULT - ASSESSMENT
61 year old woman with PMH HTN, history of stomach and thyroid cancer (neither active as per family) spinal lesion (unclear etiology, followed q 1 month with MRI as per family) and paraplegia presents with complaint of b/l leg swelling for unknown amount of time.  Family states that they noticed the patient's feet were swollen for the last few weeks and advised her to see her doctor but the patient had trouble leaving her home due to her paralysis.  She also complains of cough with pleuritic chest pain x 1 week.  She denies fever, chills, wheezing, SOB.LE US positive for b/l DVT.  met with patient and called daughter/HCP and discussed GOC & Advanced care planning. Palliative care info/counseling provided.                      Advanced Directives addressed. Spoke to daughter in detail on advanced care planning. As per daughter they would like to send patient for rehab and the for PET SCAN and continue aggressive treatment, including full code. Not ready for Goals of care and advanced care planning at this time.

## 2017-03-07 LAB
CORTISOL FREE, URINE 24 HOUR: SIGNIFICANT CHANGE UP
CREAT 24H UR-MCNC: SIGNIFICANT CHANGE UP
CREAT ?TM UR-MCNC: 48 MG/DL — SIGNIFICANT CHANGE UP
FREE CORTISOL, URINE: 2.8 UG/DL — SIGNIFICANT CHANGE UP
FREE CORTISOL/CREATININE RATIO: 58 UG/G — SIGNIFICANT CHANGE UP
HCT VFR BLD CALC: 31.5 % — LOW (ref 34.5–45)
HGB BLD-MCNC: 10.1 G/DL — LOW (ref 11.5–15.5)
MCHC RBC-ENTMCNC: 30.9 PG — SIGNIFICANT CHANGE UP (ref 27–34)
MCHC RBC-ENTMCNC: 32.1 GM/DL — SIGNIFICANT CHANGE UP (ref 32–36)
MCV RBC AUTO: 96.5 FL — SIGNIFICANT CHANGE UP (ref 80–100)
NON-GYNECOLOGICAL CYTOLOGY STUDY: SIGNIFICANT CHANGE UP
NON-GYNECOLOGICAL CYTOLOGY STUDY: SIGNIFICANT CHANGE UP
PLATELET # BLD AUTO: 306 K/UL — SIGNIFICANT CHANGE UP (ref 150–400)
RBC # BLD: 3.26 M/UL — LOW (ref 3.8–5.2)
RBC # FLD: 13.7 % — SIGNIFICANT CHANGE UP (ref 11–15)
THYROGLOBULIN REFLEX TO MS OR IA RESULT: 54 IU/ML — HIGH
WBC # BLD: 10.2 K/UL — SIGNIFICANT CHANGE UP (ref 3.8–10.5)
WBC # FLD AUTO: 10.2 K/UL — SIGNIFICANT CHANGE UP (ref 3.8–10.5)

## 2017-03-07 PROCEDURE — 99233 SBSQ HOSP IP/OBS HIGH 50: CPT

## 2017-03-07 PROCEDURE — 99232 SBSQ HOSP IP/OBS MODERATE 35: CPT

## 2017-03-07 RX ADMIN — Medication 1 TABLET(S): at 08:44

## 2017-03-07 RX ADMIN — Medication 0.5 MILLIGRAM(S): at 02:49

## 2017-03-07 RX ADMIN — GABAPENTIN 600 MILLIGRAM(S): 400 CAPSULE ORAL at 21:35

## 2017-03-07 RX ADMIN — Medication 0.5 MILLIGRAM(S): at 21:35

## 2017-03-07 RX ADMIN — RIVAROXABAN 15 MILLIGRAM(S): KIT at 10:33

## 2017-03-07 RX ADMIN — Medication 200 MICROGRAM(S): at 05:23

## 2017-03-07 RX ADMIN — PIPERACILLIN AND TAZOBACTAM 25 GRAM(S): 4; .5 INJECTION, POWDER, LYOPHILIZED, FOR SOLUTION INTRAVENOUS at 21:35

## 2017-03-07 RX ADMIN — PIPERACILLIN AND TAZOBACTAM 25 GRAM(S): 4; .5 INJECTION, POWDER, LYOPHILIZED, FOR SOLUTION INTRAVENOUS at 05:24

## 2017-03-07 RX ADMIN — Medication 0.5 MILLIGRAM(S): at 14:00

## 2017-03-07 RX ADMIN — Medication 200 MILLIGRAM(S): at 05:23

## 2017-03-07 RX ADMIN — GABAPENTIN 600 MILLIGRAM(S): 400 CAPSULE ORAL at 14:01

## 2017-03-07 RX ADMIN — PANTOPRAZOLE SODIUM 40 MILLIGRAM(S): 20 TABLET, DELAYED RELEASE ORAL at 05:23

## 2017-03-07 RX ADMIN — MAGNESIUM OXIDE 400 MG ORAL TABLET 400 MILLIGRAM(S): 241.3 TABLET ORAL at 08:44

## 2017-03-07 RX ADMIN — PIPERACILLIN AND TAZOBACTAM 25 GRAM(S): 4; .5 INJECTION, POWDER, LYOPHILIZED, FOR SOLUTION INTRAVENOUS at 14:00

## 2017-03-07 RX ADMIN — PIPERACILLIN AND TAZOBACTAM 25 GRAM(S): 4; .5 INJECTION, POWDER, LYOPHILIZED, FOR SOLUTION INTRAVENOUS at 01:05

## 2017-03-07 RX ADMIN — RIVAROXABAN 15 MILLIGRAM(S): KIT at 23:42

## 2017-03-07 RX ADMIN — GABAPENTIN 600 MILLIGRAM(S): 400 CAPSULE ORAL at 05:23

## 2017-03-07 RX ADMIN — Medication 650 MILLIGRAM(S): at 01:07

## 2017-03-07 RX ADMIN — Medication 0.5 MILLIGRAM(S): at 05:23

## 2017-03-07 NOTE — PROGRESS NOTE ADULT - PROBLEM SELECTOR PLAN 1
-Newly Dx lung mass  -S/P IVC filter and Bronchoscopy + biopsy on 3/3, pathology c/w NSCLC  -Pulmonary and CT surgeon following, inoperable lung mass, PET scan as outpatient, and then possible chem/RT as per hem/oncology   -palliative care eval appreciated, family wants to pursue all intervention and aggressive treatment.  -IV zosyn for possible gram neg pna,in setting of fever, fever resolving,  ID following, c/w IV Abx for total 5 days as d/w ID

## 2017-03-07 NOTE — PROGRESS NOTE ADULT - SUBJECTIVE AND OBJECTIVE BOX
Patient is a 61y old  Female who presents with a chief complaint of Leg swelling (01 Mar 2017 00:23)      INTERVAL HPI / OVERNIGHT EVENTS:had low grade fever today,diana any worsening SOB or cough    MEDICATIONS  (STANDING):  influenza   Vaccine 0.5milliLiter(s) IntraMuscular once  gabapentin 600milliGRAM(s) Oral three times a day  LORazepam     Tablet 0.5milliGRAM(s) Oral three times a day  amLODIPine   Tablet 10milliGRAM(s) Oral daily  levothyroxine 200MICROGram(s) Oral daily  rivaroxaban 15milliGRAM(s) Oral <User Schedule>  fluticasone propionate 50 MICROgram(s)/spray Nasal Spray 1Spray(s) Both Nostrils two times a day  pantoprazole    Tablet 40milliGRAM(s) Oral before breakfast  piperacillin/tazobactam IVPB. 3.375Gram(s) IV Intermittent every 8 hours    MEDICATIONS  (PRN):  oxyCODONE  5 mG/acetaminophen 325 mG 1Tablet(s) Oral every 6 hours PRN Severe Pain (7 - 10)  acetaminophen   Tablet 650milliGRAM(s) Oral every 6 hours PRN For Temp greater than 38 C (100.4 F)  guaiFENesin    Syrup 200milliGRAM(s) Oral every 6 hours PRN Cough  benzonatate 100milliGRAM(s) Oral three times a day PRN Cough      Vital Signs Last 24 Hrs  T(C): 36.8, Max: 38 (03-07 @ 00:20)  T(F): 98.2, Max: 100.4 (03-07 @ 00:20)  HR: 93 (80 - 96)  BP: 125/72 (100/55 - 125/72)  BP(mean): --  RR: 16 (16 - 16)  SpO2: 95% (95% - 98%)    PHYSICAL EXAM:    Constitutional: NAD, well-groomed, well-developed  Respiratory: CTAB/L  Cardiovascular: S1 and S2, RRR, no M/G/R  Gastrointestinal: BS+, soft, NT/ND  Extremities:left leg significant edema   Vascular: 2+ peripheral pulses  Skinright leg erythema+no warmth      LABS:                        10.1   10.2  )-----------( 306      ( 07 Mar 2017 07:04 )             31.5     06 Mar 2017 07:29    140    |  105    |  12     ----------------------------<  83     3.5     |  28     |  0.43     Ca    7.1        06 Mar 2017 07:29  Phos  2.2       06 Mar 2017 07:29  Mg     1.9       06 Mar 2017 07:29              MICROBIOLOGY:  RECENT CULTURES:  03-05 .Blood Blood-Peripheral XXXX XXXX   No growth to date.          RADIOLOGY & ADDITIONAL STUDIES:

## 2017-03-07 NOTE — PHYSICAL THERAPY INITIAL EVALUATION ADULT - ACTIVE RANGE OF MOTION EXAMINATION, REHAB EVAL
deficits as listed below/L  hip/knee <90deg/Left UE Active ROM was WNL (within normal limits)/Right UE Active ROM was WNL (within normal limits)/RLE Active ROM was WNL (within normal limits)
deficits as listed below/L  hip/knee <90deg/Right UE Active ROM was WNL (within normal limits)/Left UE Active ROM was WNL (within normal limits)/RLE Active ROM was WNL (within normal limits)

## 2017-03-07 NOTE — DIETITIAN INITIAL EVALUATION ADULT. - OTHER INFO
Pt seen for LOS. Pt lives at home c family; spouse & daughter assist c ADL. Pt has been bed bound for last 6 months; agreeable to trying Ensure for stg 1 PU on sacrum. Denies any N/V/C/D or chew/swallowing difficulty. BM regular; last x 2 (3/7).

## 2017-03-07 NOTE — PROGRESS NOTE ADULT - SUBJECTIVE AND OBJECTIVE BOX
Patient seen and examined at bedside with no complaints. Admits to flatus/BM. Denies pain, nasuea/vomiting. Tolerating diet.    Vital Signs Last 24 Hrs  T(F): 99.8, Max: 100.4 (03-07 @ 00:20)  HR: 81  BP: 109/66  RR: 16  SpO2: 96%    GENERAL: Alert, NAD  CHEST/LUNG: Clear to auscultation bilaterally, respirations nonlabored  HEART: S1S2, Regular rate and rhythm;   ABDOMEN: + Bowel sounds, soft, Nontender, Nondistended  EXTREMITIES:  Right groin insertion site clean and dry with no bleeding or hematoma noted. Dressing removed.    I&O's Detail    I & Os for current day (as of 07 Mar 2017 11:09)  =============================================  IN:    Oral Fluid: 360 ml    Solution: 200 ml    Total IN: 560 ml  ---------------------------------------------  OUT:    Voided: 200 ml    Total OUT: 200 ml  ---------------------------------------------  Total NET: 360 ml    LABS:                        10.1   10.2  )-----------( 306      ( 07 Mar 2017 07:04 )             31.5     06 Mar 2017 07:29    140    |  105    |  12     ----------------------------<  83     3.5     |  28     |  0.43     Ca    7.1        06 Mar 2017 07:29  Phos  2.2       06 Mar 2017 07:29  Mg     1.9       06 Mar 2017 07:29    Surgical Pathology Final Report -  (03.03.17 @ 12:00)    Surgical Pathology Final Report - :   ACCESSION No:  [id number]    PRISCILLA MCFADDEN                          2        Surgical Final Report:    Final Diagnosis  Lung, right middle lobe, biopsy #1 & #2: Poorly differentiated non small carcinoma    Intraoperative Consultation  Lung, right middle lobe, biopsy:  Non small cell carcinoma  Dr. Wall notified.    61y old Female s/p bronchoscopy with biopsy secondary to right hilar ling mass positive for poorly differentiated non small cell carcinoma and IVC filter placement secondary to LLE DVT, POD#4 with PMH GERD, Thyroid cancer, Stomach cancer, HTN    - cont antibiotics per ID  - cont xarelto per medicine  - continue current management per medicine  - will discuss with Dr. Wall Patient seen and examined at bedside with no complaints. Admits to flatus/BM. Denies pain, nasuea/vomiting. Tolerating diet.    Vital Signs Last 24 Hrs  T(F): 99.8, Max: 100.4 (03-07 @ 00:20)  HR: 81  BP: 109/66  RR: 16  SpO2: 96%    GENERAL: Alert, NAD  CHEST/LUNG: Clear to auscultation bilaterally, respirations nonlabored  HEART: S1S2, Regular rate and rhythm;   ABDOMEN: + Bowel sounds, soft, Nontender, Nondistended  EXTREMITIES:  Right groin insertion site clean and dry with no bleeding or hematoma noted. Dressing removed.    I&O's Detail    I & Os for current day (as of 07 Mar 2017 11:09)  =============================================  IN:    Oral Fluid: 360 ml    Solution: 200 ml    Total IN: 560 ml  ---------------------------------------------  OUT:    Voided: 200 ml    Total OUT: 200 ml  ---------------------------------------------  Total NET: 360 ml    LABS:                        10.1   10.2  )-----------( 306      ( 07 Mar 2017 07:04 )             31.5     06 Mar 2017 07:29    140    |  105    |  12     ----------------------------<  83     3.5     |  28     |  0.43     Ca    7.1        06 Mar 2017 07:29  Phos  2.2       06 Mar 2017 07:29  Mg     1.9       06 Mar 2017 07:29    Surgical Final Report:    Final Diagnosis  Lung, right middle lobe, biopsy #1 & #2: Poorly differentiated non small carcinoma    Intraoperative Consultation  Lung, right middle lobe, biopsy:  Non small cell carcinoma  Dr. Wall notified.    61y old Female s/p bronchoscopy with biopsy secondary to right hilar ling mass positive for poorly differentiated non small cell carcinoma and IVC filter placement secondary to LLE DVT, POD#4 with PMH GERD, Thyroid cancer, Stomach cancer, HTN    - cont antibiotics per ID  - cont xarelto per medicine  - continue current management per medicine  - Heme/Onc note appreciated: outpatient PET scan  - Discussed with Dr. Wall, No further surgical intervention at this time. Reconsult PRN.

## 2017-03-07 NOTE — PROGRESS NOTE ADULT - PROBLEM SELECTOR PLAN 1
possible gram neg pneumonia  cont zosyn (day 4/5)  monitor for fever  possible fever can be sec to DVT and solid tumor with mets too .  Has mild leukocytosis as well today

## 2017-03-07 NOTE — PHYSICAL THERAPY INITIAL EVALUATION ADULT - LEVEL OF INDEPENDENCE: SIT/STAND, REHAB EVAL
maximum assist (25% patients effort)
To be assessed due to lower limb weakness at time of assessment

## 2017-03-07 NOTE — PROGRESS NOTE ADULT - PROBLEM SELECTOR PLAN 7
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 primary neurologist dr. Brandt  -Neuro following up, MRI brain, LS spine noted. DJD of spine, pre sacral edema and LN, to be followed as outpatient by PCP and oncology, as pt. will be getting PET scan as outpatient by Oncology.  - MARIAN upon DC as per PT eval.

## 2017-03-07 NOTE — PROGRESS NOTE ADULT - SUBJECTIVE AND OBJECTIVE BOX
INTERVAL HPI/OVERNIGHT EVENTS:  61 year old woman with PMH HTN, history of stomach CA s/p subtotal gastrectomy 5 years ago, and thyroid cancer s/p surgery 17 years ago (neither active as per family)Cervical spinal lesion (unclear etiology, followed q 1 month with MRI as per family) and bilateral LE weakness with neuropathy, with limited mobility. Presented with complaint of b/l leg swelling for unknown amount of time.  Family states that they noticed the patient's feet were swollen for the last few weeks and advised her to see her doctor but the patient had trouble leaving her home due to her LE weakness.  She also complains of cough with pleuritic chest pain x 1 week.  She denies fever, chills, wheezing, SOB.    Found to have extensive right leg DVT and right lung  mass S/P IVC filter and Bronchoscopy on 03/03/17.  Awake and comfortable, low grade fever noted.    Vital Signs Last 24 Hrs  T(C): 37.3, Max: 38 (03-07 @ 00:20)  T(F): 99.2, Max: 100.4 (03-07 @ 00:20)  HR: 95 (80 - 96)  BP: 125/71 (100/55 - 127/73)  BP(mean): --  RR: 16 (16 - 16)  SpO2: 98% (95% - 98%)    PHYSICAL EXAM:  GEN:        Awake, responsive and comfortable.  HEENT:    Normal.    RESP:       no wheezing.  CVS:          Regular rate and rhythm.   ABD:         Soft, non-tender, non-distended;   :             No costovertebral angle tenderness  EXTR:           left leg edema  CNS:              Intact sensory and motor function.    MEDICATIONS  (STANDING):  influenza   Vaccine 0.5milliLiter(s) IntraMuscular once  gabapentin 600milliGRAM(s) Oral three times a day  LORazepam     Tablet 0.5milliGRAM(s) Oral three times a day  amLODIPine   Tablet 10milliGRAM(s) Oral daily  levothyroxine 200MICROGram(s) Oral daily  rivaroxaban 15milliGRAM(s) Oral <User Schedule>  fluticasone propionate 50 MICROgram(s)/spray Nasal Spray 1Spray(s) Both Nostrils two times a day  pantoprazole    Tablet 40milliGRAM(s) Oral before breakfast  piperacillin/tazobactam IVPB. 3.375Gram(s) IV Intermittent every 8 hours    MEDICATIONS  (PRN):  oxyCODONE  5 mG/acetaminophen 325 mG 1Tablet(s) Oral every 6 hours PRN Severe Pain (7 - 10)  acetaminophen   Tablet 650milliGRAM(s) Oral every 6 hours PRN For Temp greater than 38 C (100.4 F)  guaiFENesin    Syrup 200milliGRAM(s) Oral every 6 hours PRN Cough  benzonatate 100milliGRAM(s) Oral three times a day PRN Cough    LABS:                        10.1   10.2  )-----------( 306      ( 07 Mar 2017 07:04 )             31.5     06 Mar 2017 07:29    140    |  105    |  12     ----------------------------<  83     3.5     |  28     |  0.43     Ca    7.1        06 Mar 2017 07:29  Phos  2.2       06 Mar 2017 07:29  Mg     1.9       06 Mar 2017 07:29    ASSESSMENT AND PLAN:  ·	Right lung mass. S/P Bronchoscopy on 03/03/17.  ·	Left leg DVT ( greater saphenous popliteal, femoral)  ·	Pleural effusion.  ·	Gastric CA history.  ·	Thyroid CA history.  ·	HTN.  ·	Anemia.  ·	Hypokalemia better.  ·	Fever.  ·	Cough better.    Continue antibiotics and Xarelto.  Follow biopsy results.

## 2017-03-07 NOTE — PROGRESS NOTE ADULT - PROBLEM SELECTOR PLAN 1
s/p partial thyroidectomy, hypothyroid  continue increased dose of levothyroxine 200mcg daily  TG ab + so TG level of no use  neck sono showed [id number] left thyroid bed lesion, when compared to 3/26/15 sono from [facility] described as right hemithyroidectomy with left lobe measuring 3.8x1.3x1.1cm, lt "lesion" likely left lobe remnant  will follow as outpt

## 2017-03-07 NOTE — PROGRESS NOTE ADULT - PROBLEM SELECTOR PLAN 1
Right Lung cancer.  Not a Surgical Candidate.  PET Scan to Evaluate Metastases.  Consider chemotherapy and Radiation.

## 2017-03-07 NOTE — PHYSICAL THERAPY INITIAL EVALUATION ADULT - CRITERIA FOR SKILLED THERAPEUTIC INTERVENTIONS
risk reduction/prevention/rehab potential/therapy frequency/predicted duration of therapy intervention/anticipated equipment needs at discharge/impairments found/anticipated discharge recommendation/functional limitations in following categories
rehab potential/therapy frequency/risk reduction/prevention/impairments found/functional limitations in following categories

## 2017-03-07 NOTE — PHYSICAL THERAPY INITIAL EVALUATION ADULT - PLANNED THERAPY INTERVENTIONS, PT EVAL
stretching/strengthening/postural re-education/motor coordination training/manual therapy techniques/transfer training/bed mobility training/gait training/ROM/balance training/lumbar stabilization/neuromuscular re-education
bed mobility training/balance training/transfer training/strengthening/ROM/stretching

## 2017-03-07 NOTE — PHYSICAL THERAPY INITIAL EVALUATION ADULT - ADDITIONAL COMMENTS
Pt lives in private house on first floor with family. Pt needs assist with all ADL's, able to stand with assist to transfer to bedside commode with assistance.
Pt lives in private house on first floor with family. Pt needs assist with all ADL's, able to stand with assist to transfer to bedside commode with assistance.

## 2017-03-07 NOTE — DIETITIAN INITIAL EVALUATION ADULT. - PERTINENT LABORATORY DATA
03-06 Na140 mmol/L Glu 83 mg/dL K+ 3.5 mmol/L Cr  0.43 mg/dL<L> BUN 12 mg/dL Phos 2.2 mg/dL<L> Alb n/a   PAB n/a; Ca 7.1L

## 2017-03-07 NOTE — PROGRESS NOTE ADULT - SUBJECTIVE AND OBJECTIVE BOX
INTERVAL HPI/OVERNIGHT EVENTS:  Subjective Complaints:  General weakness; complains of left leg still swollen but with skin coloration and warmth.  Heavy to lift left leg.  Received Percocet 5/325 for left leg pain.  Mental state excellent.    RECENT RADIOLOGY & ADDITIONAL TESTS:    NEUROLOGICAL EXAM (Pertinent):  Vital Signs Last 24 Hrs  T(C): 37.7, Max: 38 (03-07 @ 00:20)  T(F): 99.8, Max: 100.4 (03-07 @ 00:20)  HR: 81 (81 - 96)  BP: 109/66 (100/55 - 115/71)  BP(mean): --  RR: 16 (16 - 18)  SpO2: 96% (95% - 96%)    MEDICATIONS  (STANDING):  influenza   Vaccine 0.5milliLiter(s) IntraMuscular once  gabapentin 600milliGRAM(s) Oral three times a day  LORazepam     Tablet 0.5milliGRAM(s) Oral three times a day  amLODIPine   Tablet 10milliGRAM(s) Oral daily  levothyroxine 200MICROGram(s) Oral daily  rivaroxaban 15milliGRAM(s) Oral <User Schedule>  fluticasone propionate 50 MICROgram(s)/spray Nasal Spray 1Spray(s) Both Nostrils two times a day  pantoprazole    Tablet 40milliGRAM(s) Oral before breakfast  piperacillin/tazobactam IVPB. 3.375Gram(s) IV Intermittent every 8 hours    MEDICATIONS  (PRN):  oxyCODONE  5 mG/acetaminophen 325 mG 1Tablet(s) Oral every 6 hours PRN Severe Pain (7 - 10)  acetaminophen   Tablet 650milliGRAM(s) Oral every 6 hours PRN For Temp greater than 38 C (100.4 F)  guaiFENesin    Syrup 200milliGRAM(s) Oral every 6 hours PRN Cough  benzonatate 100milliGRAM(s) Oral three times a day PRN Cough    LABS:                        10.1   10.2  )-----------( 306      ( 07 Mar 2017 07:04 )             31.5     06 Mar 2017 07:29    140    |  105    |  12     ----------------------------<  83     3.5     |  28     |  0.43     Ca    7.1        06 Mar 2017 07:29  Phos  2.2       06 Mar 2017 07:29  Mg     1.9       06 Mar 2017 07:29

## 2017-03-07 NOTE — PHYSICAL THERAPY INITIAL EVALUATION ADULT - BED MOBILITY LIMITATIONS, REHAB EVAL
decreased ability to use legs for bridging/pushing/decreased ability to use arms for pushing/pulling
impaired ability to control trunk for mobility/decreased ability to use legs for bridging/pushing/decreased ability to use arms for pushing/pulling

## 2017-03-07 NOTE — DIETITIAN INITIAL EVALUATION ADULT. - ENERGY NEEDS
Height (cm): 177.8 (03-03)  Weight (kg): 83.6 (03-03)  BMI (kg/m2): 26.4 (03-03)  IBW: 75.5 kg +/- 10%         % IBW:  111%           UBW:   stable           %UBW: 100%

## 2017-03-07 NOTE — PHYSICAL THERAPY INITIAL EVALUATION ADULT - IMPAIRMENTS FOUND, PT EVAL
poor safety awareness/ventilation and respiration/gas exchange/aerobic capacity/endurance/decreased midline orientation/ergonomics and body mechanics/gross motor/arousal, attention, and cognition/integumentary integrity/neuromotor development and sensory integration/posture/ROM/muscle strength/gait, locomotion, and balance/joint integrity and mobility
ROM/muscle strength/posture/gait, locomotion, and balance/gross motor/circulation

## 2017-03-07 NOTE — PHYSICAL THERAPY INITIAL EVALUATION ADULT - GENERAL OBSERVATIONS, REHAB EVAL
Patient encountered supine in bed, vital signs as charted.AAOx4. Denies pain or shortness of breath.Chest Auscultation: clear breathsounds throughout, diminished on both bases. Palpation: no tactile fremitus, symmetric expansion. Note left lower limb gross edema from hip down to toes (?lymphedema).

## 2017-03-07 NOTE — PHYSICAL THERAPY INITIAL EVALUATION ADULT - PASSIVE RANGE OF MOTION EXAMINATION, REHAB EVAL
L hip/knee <90 deg/deficits as listed below/Right LE Passive ROM was WNL (within normal limits)/Left UE Passive ROM was WNL (within normal limits)/Right UE Passive ROM was WNL (within normal limits)
Left UE Passive ROM was WNL (within normal limits)/Right LE Passive ROM was WNL (within normal limits)/deficits as listed below/L hip/knee <90 deg/Right UE Passive ROM was WNL (within normal limits)

## 2017-03-07 NOTE — PHYSICAL THERAPY INITIAL EVALUATION ADULT - PERTINENT HX OF CURRENT PROBLEM, REHAB EVAL
Patient re-evaluated today to Patient re-evaluated today to determine current functional status. Originally admitted with left lower limb swelling and pain, + DVT  on left lower limb. Chart reviewed and noted MRI brain ruled out acute changes, MRI spine c L5-S1 foraminal narrowing and L5 spine chronic deformity.

## 2017-03-07 NOTE — PROGRESS NOTE ADULT - SUBJECTIVE AND OBJECTIVE BOX
Patient is a 61y old  Female who presents with a chief complaint of Leg swelling (01 Mar 2017 00:23)      INTERVAL HPI/OVERNIGHT EVENTS:  pt with no complaints at this time  +lung ca, not surgical candidiate    MEDICATIONS  (STANDING):  influenza   Vaccine 0.5milliLiter(s) IntraMuscular once  gabapentin 600milliGRAM(s) Oral three times a day  LORazepam     Tablet 0.5milliGRAM(s) Oral three times a day  amLODIPine   Tablet 10milliGRAM(s) Oral daily  levothyroxine 200MICROGram(s) Oral daily  piperacillin/tazobactam IVPB. 3.375Gram(s) IV Intermittent every 8 hours  rivaroxaban 15milliGRAM(s) Oral <User Schedule>  magnesium oxide 400milliGRAM(s) Oral three times a day with meals  fluticasone propionate 50 MICROgram(s)/spray Nasal Spray 1Spray(s) Both Nostrils two times a day  potassium acid phosphate/sodium acid phosphate tablet (K-PHOS No. 2) 1Tablet(s) Oral four times a day with meals  pantoprazole    Tablet 40milliGRAM(s) Oral before breakfast    MEDICATIONS  (PRN):  oxyCODONE  5 mG/acetaminophen 325 mG 1Tablet(s) Oral every 6 hours PRN Severe Pain (7 - 10)  acetaminophen   Tablet 650milliGRAM(s) Oral every 6 hours PRN For Temp greater than 38 C (100.4 F)  guaiFENesin    Syrup 200milliGRAM(s) Oral every 6 hours PRN Cough  benzonatate 100milliGRAM(s) Oral three times a day PRN Cough      REVIEW OF SYSTEMS:  CONSTITUTIONAL: No fever, weight loss, or fatigue  RESPIRATORY: No cough, wheezing, chills or hemoptysis; No shortness of breath  CARDIOVASCULAR: No chest pain, palpitations, dizziness, or leg swelling  GASTROINTESTINAL: No abdominal or epigastric pain. No nausea, vomiting, or hematemesis; No diarrhea or constipation. No melena or hematochezia.  ENDOCRINE: No heat or cold intolerance; No hair loss      Vital Signs Last 24 Hrs  T(C): 37.7, Max: 37.7 (03-06 @ 00:50)  T(F): 99.8, Max: 99.8 (03-06 @ 00:50)  HR: 96 (96 - 100)  BP: 129/68 (111/59 - 129/68)  BP(mean): --  RR: 18 (18 - 18)  SpO2: 96% (96% - 98%)    PHYSICAL EXAM:  GENERAL: NAD, well-groomed, well-developed  CHEST/LUNG: Clear to percussion bilaterally; No rales, rhonchi, wheezing, or rubs  HEART: Regular rate and rhythm; No murmurs, rubs, or gallops      LABS:                        11.3   11.7  )-----------( 296      ( 06 Mar 2017 07:29 )             32.8     06 Mar 2017 07:29    140    |  105    |  12     ----------------------------<  83     3.5     |  28     |  0.43     Ca    7.1        06 Mar 2017 07:29  Phos  2.2       06 Mar 2017 07:29  Mg     1.9       06 Mar 2017 07:29          CAPILLARY BLOOD GLUCOSE    Lipid panel:               RADIOLOGY & ADDITIONAL TESTS:

## 2017-03-07 NOTE — PROGRESS NOTE ADULT - SUBJECTIVE AND OBJECTIVE BOX
CHIEF COMPLAINT/INTERVAL HISTORY:    Patient is a 61y old  Female who presents with a chief complaint of Leg swelling (01 Mar 2017 00:23)      HPI:  61 year old woman with PMH HTN, history of stomach and thyroid cancer (neither active as per family) spinal lesion (unclear etiology, followed q 1 month with MRI as per family) and paraplegia presents with complaint of b/l leg swelling for unknown amount of time.  Family states that they noticed the patient's feet were swollen for the last few weeks and advised her to see her doctor but the patient had trouble leaving her home due to her paralysis.  She also complains of cough with pleuritic chest pain x 1 week.  She denies fever, chills, wheezing, SOB.      LE US positive for b/l DVT. (01 Mar 2017 00:23)      SUBJECTIVE & OBJECTIVE: Pt seen and examined at bedside.   c/o heaviness LLE,   some improvement in weakness of b/l LE  Denies chest pain/SOB, nausea/vomiting/diarrhea, No cough, dizziness, HA or blurry vision, all other ROS negative.      Vital Signs Last 24 Hrs  T(C): 37.7, Max: 38 (03-07 @ 00:20)  T(F): 99.8, Max: 100.4 (03-07 @ 00:20)  HR: 80 (80 - 96)  BP: 107/68 (100/55 - 115/71)  BP(mean): --  ABP: --  ABP(mean): --  RR: 16 (16 - 16)  SpO2: 98% (95% - 98%)        MEDICATIONS  (STANDING):  influenza   Vaccine 0.5milliLiter(s) IntraMuscular once  gabapentin 600milliGRAM(s) Oral three times a day  LORazepam     Tablet 0.5milliGRAM(s) Oral three times a day  amLODIPine   Tablet 10milliGRAM(s) Oral daily  levothyroxine 200MICROGram(s) Oral daily  rivaroxaban 15milliGRAM(s) Oral <User Schedule>  fluticasone propionate 50 MICROgram(s)/spray Nasal Spray 1Spray(s) Both Nostrils two times a day  pantoprazole    Tablet 40milliGRAM(s) Oral before breakfast  piperacillin/tazobactam IVPB. 3.375Gram(s) IV Intermittent every 8 hours    MEDICATIONS  (PRN):  oxyCODONE  5 mG/acetaminophen 325 mG 1Tablet(s) Oral every 6 hours PRN Severe Pain (7 - 10)  acetaminophen   Tablet 650milliGRAM(s) Oral every 6 hours PRN For Temp greater than 38 C (100.4 F)  guaiFENesin    Syrup 200milliGRAM(s) Oral every 6 hours PRN Cough  benzonatate 100milliGRAM(s) Oral three times a day PRN Cough        PHYSICAL EXAM:    GENERAL: NAD, well-developed  HEAD:  Atraumatic, Normocephalic  EYES: EOMI, PERRLA, conjunctiva and sclera clear, + pallor  ENMT: Moist mucous membranes  NECK: Supple, No JVD  NERVOUS SYSTEM:  Alert & Oriented X3, cranial nerves intact, speech normal, chronic weakness of b/l LE  CHEST/LUNG: Clear to auscultation bilaterally; No rales, rhonchi, wheezing, or rubs  HEART: Regular rate and rhythm;  ABDOMEN: Soft, Nontender, Nondistended; Bowel sounds present  EXTREMITIES:  LLE edema.      LABS:                        10.1   10.2  )-----------( 306      ( 07 Mar 2017 07:04 )             31.5     06 Mar 2017 07:29    140    |  105    |  12     ----------------------------<  83     3.5     |  28     |  0.43     Ca    7.1        06 Mar 2017 07:29  Phos  2.2       06 Mar 2017 07:29  Mg     1.9       06 Mar 2017 07:29            CAPILLARY BLOOD GLUCOSE      RECENT CULTURES:      RADIOLOGY & ADDITIONAL TESTS:    Health Issues:  ACUTE DEEP VEIN THROMBOSIS LOWER EXTREMITY  PT FELL  Acute deep vein thrombosis (DVT) of left lower extremity, unspecified vein  Hypophosphatemia  Hypomagnesemia  Fever, unspecified fever cause  Malignant neoplasm of middle lobe of right lung  Hypokalemia  Generalized weakness  Stomach cancer  Weakness  Adrenal mass  Hilar mass  Lung mass  Preventive measure  Thyroid cancer  Malignant neoplasm of stomach, unspecified location  Essential hypertension  Abnormal chest x-ray  Acute deep vein thrombosis (DVT) of left lower extremity, unspecified vein  Thrombophlebitis of the femoral vein  Suspected pulmonary embolism      DVT/GI ppx  Discussed with pt @ bedside CHIEF COMPLAINT/INTERVAL HISTORY:    Patient is a 61y old  Female who presents with a chief complaint of Leg swelling (01 Mar 2017 00:23)      HPI:  61 year old woman with PMH HTN, history of stomach and thyroid cancer (neither active as per family) spinal lesion (unclear etiology, followed q 1 month with MRI as per family) and paraplegia presents with complaint of b/l leg swelling for unknown amount of time.  Family states that they noticed the patient's feet were swollen for the last few weeks and advised her to see her doctor but the patient had trouble leaving her home due to her paralysis.  She also complains of cough with pleuritic chest pain x 1 week.  She denies fever, chills, wheezing, SOB.      LE US positive for b/l DVT. (01 Mar 2017 00:23)      SUBJECTIVE & OBJECTIVE: Pt seen and examined at bedside.   c/o heaviness LLE,   some improvement in weakness of b/l LE  Denies chest pain/SOB, nausea/vomiting/diarrhea, No cough, dizziness, HA or blurry vision, all other ROS negative.      Vital Signs Last 24 Hrs  T(C): 37.7, Max: 38 (03-07 @ 00:20)  T(F): 99.8, Max: 100.4 (03-07 @ 00:20)  HR: 80 (80 - 96)  BP: 107/68 (100/55 - 115/71)  BP(mean): --  ABP: --  ABP(mean): --  RR: 16 (16 - 16)  SpO2: 98% (95% - 98%)        MEDICATIONS  (STANDING):  influenza   Vaccine 0.5milliLiter(s) IntraMuscular once  gabapentin 600milliGRAM(s) Oral three times a day  LORazepam     Tablet 0.5milliGRAM(s) Oral three times a day  amLODIPine   Tablet 10milliGRAM(s) Oral daily  levothyroxine 200MICROGram(s) Oral daily  rivaroxaban 15milliGRAM(s) Oral <User Schedule>  fluticasone propionate 50 MICROgram(s)/spray Nasal Spray 1Spray(s) Both Nostrils two times a day  pantoprazole    Tablet 40milliGRAM(s) Oral before breakfast  piperacillin/tazobactam IVPB. 3.375Gram(s) IV Intermittent every 8 hours    MEDICATIONS  (PRN):  oxyCODONE  5 mG/acetaminophen 325 mG 1Tablet(s) Oral every 6 hours PRN Severe Pain (7 - 10)  acetaminophen   Tablet 650milliGRAM(s) Oral every 6 hours PRN For Temp greater than 38 C (100.4 F)  guaiFENesin    Syrup 200milliGRAM(s) Oral every 6 hours PRN Cough  benzonatate 100milliGRAM(s) Oral three times a day PRN Cough        PHYSICAL EXAM:    GENERAL: NAD, well-developed  HEAD:  Atraumatic, Normocephalic  EYES: EOMI, PERRLA, conjunctiva and sclera clear, + pallor  ENMT: Moist mucous membranes  NECK: Supple, No JVD  NERVOUS SYSTEM:  Alert & Oriented X3, cranial nerves intact, speech normal, chronic weakness of b/l LE  CHEST/LUNG: Clear to auscultation bilaterally; No rales, rhonchi, wheezing, or rubs  HEART: Regular rate and rhythm;  ABDOMEN: Soft, Nontender, Nondistended; Bowel sounds present  EXTREMITIES:  LLE edema.      LABS:                        10.1   10.2  )-----------( 306      ( 07 Mar 2017 07:04 )             31.5     06 Mar 2017 07:29    140    |  105    |  12     ----------------------------<  83     3.5     |  28     |  0.43     Ca    7.1        06 Mar 2017 07:29  Phos  2.2       06 Mar 2017 07:29  Mg     1.9       06 Mar 2017 07:29 CHIEF COMPLAINT/INTERVAL HISTORY:    Patient is a 61y old  Female who presents with a chief complaint of Leg swelling (01 Mar 2017 00:23)      HPI:  61 year old woman with PMH HTN, history of stomach and thyroid cancer (neither active as per family) spinal lesion (unclear etiology, followed q 1 month with MRI as per family) and paraplegia presents with complaint of b/l leg swelling for unknown amount of time.  Family states that they noticed the patient's feet were swollen for the last few weeks and advised her to see her doctor but the patient had trouble leaving her home due to her paralysis.  She also complains of cough with pleuritic chest pain x 1 week.  She denies fever, chills, wheezing, SOB.        SUBJECTIVE & OBJECTIVE: Pt seen and examined at bedside.   c/o heaviness LLE,   some improvement in weakness of b/l LE  Denies chest pain/SOB, nausea/vomiting/diarrhea, No cough, dizziness, HA or blurry vision, all other ROS negative.      Vital Signs Last 24 Hrs  T(C): 37.7, Max: 38 (03-07 @ 00:20)  T(F): 99.8, Max: 100.4 (03-07 @ 00:20)  HR: 80 (80 - 96)  BP: 107/68 (100/55 - 115/71)  BP(mean): --  ABP: --  ABP(mean): --  RR: 16 (16 - 16)  SpO2: 98% (95% - 98%)        MEDICATIONS  (STANDING):  influenza   Vaccine 0.5milliLiter(s) IntraMuscular once  gabapentin 600milliGRAM(s) Oral three times a day  LORazepam     Tablet 0.5milliGRAM(s) Oral three times a day  amLODIPine   Tablet 10milliGRAM(s) Oral daily  levothyroxine 200MICROGram(s) Oral daily  rivaroxaban 15milliGRAM(s) Oral <User Schedule>  fluticasone propionate 50 MICROgram(s)/spray Nasal Spray 1Spray(s) Both Nostrils two times a day  pantoprazole    Tablet 40milliGRAM(s) Oral before breakfast  piperacillin/tazobactam IVPB. 3.375Gram(s) IV Intermittent every 8 hours    MEDICATIONS  (PRN):  oxyCODONE  5 mG/acetaminophen 325 mG 1Tablet(s) Oral every 6 hours PRN Severe Pain (7 - 10)  acetaminophen   Tablet 650milliGRAM(s) Oral every 6 hours PRN For Temp greater than 38 C (100.4 F)  guaiFENesin    Syrup 200milliGRAM(s) Oral every 6 hours PRN Cough  benzonatate 100milliGRAM(s) Oral three times a day PRN Cough        PHYSICAL EXAM:    GENERAL: NAD, well-developed  HEAD:  Atraumatic, Normocephalic  EYES: EOMI, PERRLA, conjunctiva and sclera clear, + pallor  ENMT: Moist mucous membranes  NECK: Supple, No JVD  NERVOUS SYSTEM:  Alert & Oriented X3, cranial nerves intact, speech normal, chronic weakness of b/l LE  CHEST/LUNG: Clear to auscultation bilaterally; No rales, rhonchi, wheezing, or rubs  HEART: Regular rate and rhythm;  ABDOMEN: Soft, Nontender, Nondistended; Bowel sounds present  EXTREMITIES:  LLE edema.      LABS:                        10.1   10.2  )-----------( 306      ( 07 Mar 2017 07:04 )             31.5     06 Mar 2017 07:29    140    |  105    |  12     ----------------------------<  83     3.5     |  28     |  0.43     Ca    7.1        06 Mar 2017 07:29  Phos  2.2       06 Mar 2017 07:29  Mg     1.9       06 Mar 2017 07:29 CHIEF COMPLAINT/INTERVAL HISTORY:    Patient is a 61y old  Female who presents with a chief complaint of Leg swelling (01 Mar 2017 00:23)      HPI:  61 year old woman with PMH HTN, history of stomach and thyroid cancer (neither active as per family) spinal lesion (unclear etiology, followed q 1 month with MRI as per family) and paraplegia presents with complaint of b/l leg swelling for unknown amount of time.  Family states that they noticed the patient's feet were swollen for the last few weeks and advised her to see her doctor but the patient had trouble leaving her home due to her paralysis.  She also complains of cough with pleuritic chest pain x 1 week.  She denies fever, chills, wheezing, SOB.        SUBJECTIVE & OBJECTIVE: Pt seen and examined at bedside.   c/o heaviness LLE,   some improvement in weakness of b/l LE  Denies chest pain/SOB, nausea/vomiting/diarrhea, No cough, dizziness, HA or blurry vision, all other ROS negative.      Vital Signs Last 24 Hrs  T(C): 37.7, Max: 38 (03-07 @ 00:20)  T(F): 99.8, Max: 100.4 (03-07 @ 00:20)  HR: 80 (80 - 96)  BP: 107/68 (100/55 - 115/71)  BP(mean): --  ABP: --  ABP(mean): --  RR: 16 (16 - 16)  SpO2: 98% (95% - 98%)        MEDICATIONS  (STANDING):  influenza   Vaccine 0.5milliLiter(s) IntraMuscular once  gabapentin 600milliGRAM(s) Oral three times a day  LORazepam     Tablet 0.5milliGRAM(s) Oral three times a day  amLODIPine   Tablet 10milliGRAM(s) Oral daily  levothyroxine 200MICROGram(s) Oral daily  rivaroxaban 15milliGRAM(s) Oral <User Schedule>  fluticasone propionate 50 MICROgram(s)/spray Nasal Spray 1Spray(s) Both Nostrils two times a day  pantoprazole    Tablet 40milliGRAM(s) Oral before breakfast  piperacillin/tazobactam IVPB. 3.375Gram(s) IV Intermittent every 8 hours    MEDICATIONS  (PRN):  oxyCODONE  5 mG/acetaminophen 325 mG 1Tablet(s) Oral every 6 hours PRN Severe Pain (7 - 10)  acetaminophen   Tablet 650milliGRAM(s) Oral every 6 hours PRN For Temp greater than 38 C (100.4 F)  guaiFENesin    Syrup 200milliGRAM(s) Oral every 6 hours PRN Cough  benzonatate 100milliGRAM(s) Oral three times a day PRN Cough        PHYSICAL EXAM:    GENERAL: NAD, well-developed  HEAD:  Atraumatic, Normocephalic  EYES: EOMI, PERRLA, conjunctiva and sclera clear, + pallor  ENMT: Moist mucous membranes  NECK: Supple, No JVD  NERVOUS SYSTEM:  Alert & Oriented X3, cranial nerves intact, speech normal, chronic weakness of b/l LE  CHEST/LUNG: Clear to auscultation bilaterally; decreased bs right lung.  HEART: Regular rate and rhythm;  ABDOMEN: Soft, Nontender, Nondistended; Bowel sounds present  EXTREMITIES:  LLE edema.      LABS:                        10.1   10.2  )-----------( 306      ( 07 Mar 2017 07:04 )             31.5     06 Mar 2017 07:29    140    |  105    |  12     ----------------------------<  83     3.5     |  28     |  0.43     Ca    7.1        06 Mar 2017 07:29  Phos  2.2       06 Mar 2017 07:29  Mg     1.9       06 Mar 2017 07:29

## 2017-03-07 NOTE — PHYSICAL THERAPY INITIAL EVALUATION ADULT - MODIFIED CLINICAL TEST OF SENSORY INTEGRATION IN BALANCE TEST
Barthel Index: Feeding Score _10__, Bathing Score _0__, Grooming Score _0__, Dressing Score _5__, Bowels Score _5__, Bladder Score _5__, Toilet Score _0__, Transfers Score _0__, Mobility Score _0__, Stairs Score _0__,     Total Score _25__
Barthel Index: Feeding Score-10   Bathing Score-0   Grooming Score-5   Dressing Score-0   Bowels Score-10   Bladder Score-10   Toilet Score-5   Transfers Score-5   Mobility Score-0   Stairs Score-0     Total Score-  45/100

## 2017-03-07 NOTE — PHYSICAL THERAPY INITIAL EVALUATION ADULT - MANUAL MUSCLE TESTING RESULTS, REHAB EVAL
bilateral UE R LE grossly 3/5; L LE grossly 2+/5/grossly assessed due to
bilateral UE R LE grossly 3/5; L LE grossly 3-/5/grossly assessed due to

## 2017-03-07 NOTE — DIETITIAN INITIAL EVALUATION ADULT. - NS AS NUTRI INTERV ED CONTENT
Recommended modifications/Nutrition relationship to health/disease/Provided nutrition education material

## 2017-03-07 NOTE — PHYSICAL THERAPY INITIAL EVALUATION ADULT - LEVEL OF INDEPENDENCE: STAND/SIT, REHAB EVAL
To be assessed due to lower limb weakness at time of assessment
maximum assist (25% patients effort)

## 2017-03-07 NOTE — DIETITIAN INITIAL EVALUATION ADULT. - PROBLEM SELECTOR PLAN 6
On heparin drip  General precautions  Continue home medications  Confirm medical history with PMD Dr. Guy [phone]  Will send Hep C as pt born 1955.

## 2017-03-07 NOTE — PROGRESS NOTE ADULT - SUBJECTIVE AND OBJECTIVE BOX
INTERVAL HISTORY:    Cough.  Leg Swelling.      MEDICATIONS  (STANDING):  influenza   Vaccine 0.5milliLiter(s) IntraMuscular once  gabapentin 600milliGRAM(s) Oral three times a day  LORazepam     Tablet 0.5milliGRAM(s) Oral three times a day  amLODIPine   Tablet 10milliGRAM(s) Oral daily  levothyroxine 200MICROGram(s) Oral daily  rivaroxaban 15milliGRAM(s) Oral <User Schedule>  fluticasone propionate 50 MICROgram(s)/spray Nasal Spray 1Spray(s) Both Nostrils two times a day  pantoprazole    Tablet 40milliGRAM(s) Oral before breakfast  piperacillin/tazobactam IVPB. 3.375Gram(s) IV Intermittent every 8 hours    MEDICATIONS  (PRN):  oxyCODONE  5 mG/acetaminophen 325 mG 1Tablet(s) Oral every 6 hours PRN Severe Pain (7 - 10)  acetaminophen   Tablet 650milliGRAM(s) Oral every 6 hours PRN For Temp greater than 38 C (100.4 F)  guaiFENesin    Syrup 200milliGRAM(s) Oral every 6 hours PRN Cough  benzonatate 100milliGRAM(s) Oral three times a day PRN Cough      Vital Signs Last 24 Hrs  T(C): 37.7, Max: 38 (03-07 @ 00:20)  T(F): 99.8, Max: 100.4 (03-07 @ 00:20)  HR: 81 (81 - 96)  BP: 109/66 (100/55 - 115/71)  BP(mean): --  RR: 16 (16 - 18)  SpO2: 96% (95% - 96%)    PHYSICAL EXAM:    GENERAL: NAD,   HEAD:  Atraumatic, Normocephalic  EYES: EOMI, PERRLA, conjunctiva and sclera clear    NECK: Supple, No JVD, Normal thyroid  NERVOUS SYSTEM:    CHEST/LUNG:  rhonchi,   HEART: Regular rate and rhythm;   ABDOMEN: Soft, Nontender.  EXTREMITIES:   edema:- +++ left Leg.  LYMPH: No lymphadenopathy noted        LABS:                        10.1   10.2  )-----------( 306      ( 07 Mar 2017 07:04 )             31.5     06 Mar 2017 07:29    140    |  105    |  12     ----------------------------<  83     3.5     |  28     |  0.43     Ca    7.1        06 Mar 2017 07:29  Phos  2.2       06 Mar 2017 07:29  Mg     1.9       06 Mar 2017 07:29              RADIOLOGY & ADDITIONAL STUDIES:    PATHOLOGY:  Surgical Pathology Final Report -  (03.03.17 @ 12:00)    Surgical Pathology Final Report - :   ACCESSION No:  [id number]    PRISCILLA MCFADDEN                          2        Surgical Final Report          Final Diagnosis  Lung, right middle lobe, biopsy #1 & #2:  - Poorly differentiated non small carcinoma  (see comment)    Velasquez Sheehan MD  (Electronic Signature)  Reported on: 03/06/17    Intraoperative Consultation  Lung, right middle lobe, biopsy:  - Non small cell carcinoma    Dr. Wall notified.    Comment  Immunostains is in progress for further characterization of the  tumor . An addendum report will follow.    Synoptic Summary  _  .    Clinical Information  Right middle and lower lobe  Operation/procedure; bronchoscopy with biopsy    Specimen Description  1-Right middle lobe biopsy  FS  2-Right middle lobe biopsies    Gross Description  1.  The specimen is received fresh for frozen section and the  specimen container is labeled as: Right middle lobe biopsy for  frozen section. It consists of  a 0.3 cm grey tan tissue fragment  . Entirely submitted to Frozen section. 1 cassette.    2.  The specimen is received in formalin and the specimen  container is labeled as: Right middle lobe biopsy. It consists of  multiple tan brown fibrillar fragments, together measuring 0.3 x  0.1 x 0.1 cm.  Entirely submitted.  One cassette.    In addition to other data that may appear on the specimen  container, the label has been inspected to confirm the presence  of the patient's name and date of birth.                  PRISCILLA MCFADDEN                          2        Surgical Final Report          [facility]  March 4, 2017 02:36 PM

## 2017-03-08 VITALS — OXYGEN SATURATION: 93 %

## 2017-03-08 DIAGNOSIS — E87.6 HYPOKALEMIA: ICD-10-CM

## 2017-03-08 LAB
ANION GAP SERPL CALC-SCNC: 8 MMOL/L — SIGNIFICANT CHANGE UP (ref 5–17)
BUN SERPL-MCNC: 13 MG/DL — SIGNIFICANT CHANGE UP (ref 7–23)
CALCIUM SERPL-MCNC: 7.2 MG/DL — LOW (ref 8.5–10.1)
CHLORIDE SERPL-SCNC: 104 MMOL/L — SIGNIFICANT CHANGE UP (ref 96–108)
CO2 SERPL-SCNC: 28 MMOL/L — SIGNIFICANT CHANGE UP (ref 22–31)
CREAT SERPL-MCNC: 0.43 MG/DL — LOW (ref 0.5–1.3)
GLUCOSE SERPL-MCNC: 83 MG/DL — SIGNIFICANT CHANGE UP (ref 70–99)
HCT VFR BLD CALC: 30.6 % — LOW (ref 34.5–45)
HGB BLD-MCNC: 10.5 G/DL — LOW (ref 11.5–15.5)
MCHC RBC-ENTMCNC: 32.5 PG — SIGNIFICANT CHANGE UP (ref 27–34)
MCHC RBC-ENTMCNC: 34.3 GM/DL — SIGNIFICANT CHANGE UP (ref 32–36)
MCV RBC AUTO: 94.8 FL — SIGNIFICANT CHANGE UP (ref 80–100)
PLATELET # BLD AUTO: 327 K/UL — SIGNIFICANT CHANGE UP (ref 150–400)
POTASSIUM SERPL-MCNC: 3.4 MMOL/L — LOW (ref 3.5–5.3)
POTASSIUM SERPL-SCNC: 3.4 MMOL/L — LOW (ref 3.5–5.3)
RBC # BLD: 3.23 M/UL — LOW (ref 3.8–5.2)
RBC # FLD: 13.4 % — SIGNIFICANT CHANGE UP (ref 11–15)
SODIUM SERPL-SCNC: 140 MMOL/L — SIGNIFICANT CHANGE UP (ref 135–145)
WBC # BLD: 10.4 K/UL — SIGNIFICANT CHANGE UP (ref 3.8–10.5)
WBC # FLD AUTO: 10.4 K/UL — SIGNIFICANT CHANGE UP (ref 3.8–10.5)

## 2017-03-08 PROCEDURE — 99233 SBSQ HOSP IP/OBS HIGH 50: CPT

## 2017-03-08 PROCEDURE — 99232 SBSQ HOSP IP/OBS MODERATE 35: CPT

## 2017-03-08 RX ORDER — LEVOTHYROXINE SODIUM 125 MCG
1 TABLET ORAL
Qty: 0 | Refills: 0 | COMMUNITY
Start: 2017-03-08

## 2017-03-08 RX ORDER — AMLODIPINE BESYLATE 2.5 MG/1
1 TABLET ORAL
Qty: 0 | Refills: 0 | COMMUNITY
Start: 2017-03-08

## 2017-03-08 RX ORDER — AMLODIPINE BESYLATE 2.5 MG/1
10 TABLET ORAL DAILY
Qty: 0 | Refills: 0 | Status: DISCONTINUED | OUTPATIENT
Start: 2017-03-09 | End: 2017-03-08

## 2017-03-08 RX ORDER — POTASSIUM CHLORIDE 20 MEQ
20 PACKET (EA) ORAL
Qty: 0 | Refills: 0 | Status: COMPLETED | OUTPATIENT
Start: 2017-03-08 | End: 2017-03-08

## 2017-03-08 RX ORDER — PANTOPRAZOLE SODIUM 20 MG/1
1 TABLET, DELAYED RELEASE ORAL
Qty: 0 | Refills: 0 | COMMUNITY
Start: 2017-03-08

## 2017-03-08 RX ORDER — IPRATROPIUM/ALBUTEROL SULFATE 18-103MCG
3 AEROSOL WITH ADAPTER (GRAM) INHALATION EVERY 6 HOURS
Qty: 0 | Refills: 0 | Status: DISCONTINUED | OUTPATIENT
Start: 2017-03-08 | End: 2017-03-08

## 2017-03-08 RX ORDER — FONDAPARINUX SODIUM 2.5 MG/.5ML
1 INJECTION, SOLUTION SUBCUTANEOUS
Qty: 0 | Refills: 0 | COMMUNITY
Start: 2017-03-08

## 2017-03-08 RX ORDER — ACETAMINOPHEN 500 MG
2 TABLET ORAL
Qty: 0 | Refills: 0 | COMMUNITY
Start: 2017-03-08

## 2017-03-08 RX ORDER — SUCRALFATE 1 G
10 TABLET ORAL
Qty: 0 | Refills: 0 | COMMUNITY

## 2017-03-08 RX ORDER — OXYCODONE HYDROCHLORIDE 5 MG/1
1 TABLET ORAL
Qty: 0 | Refills: 0 | COMMUNITY
Start: 2017-03-08

## 2017-03-08 RX ORDER — GABAPENTIN 400 MG/1
1 CAPSULE ORAL
Qty: 0 | Refills: 0 | COMMUNITY

## 2017-03-08 RX ORDER — IPRATROPIUM/ALBUTEROL SULFATE 18-103MCG
3 AEROSOL WITH ADAPTER (GRAM) INHALATION
Qty: 0 | Refills: 0 | COMMUNITY
Start: 2017-03-08

## 2017-03-08 RX ORDER — FLUTICASONE PROPIONATE 50 MCG
1 SPRAY, SUSPENSION NASAL
Qty: 0 | Refills: 0 | COMMUNITY
Start: 2017-03-08

## 2017-03-08 RX ADMIN — Medication 20 MILLIEQUIVALENT(S): at 18:13

## 2017-03-08 RX ADMIN — Medication 200 MICROGRAM(S): at 05:08

## 2017-03-08 RX ADMIN — PIPERACILLIN AND TAZOBACTAM 25 GRAM(S): 4; .5 INJECTION, POWDER, LYOPHILIZED, FOR SOLUTION INTRAVENOUS at 13:33

## 2017-03-08 RX ADMIN — AMLODIPINE BESYLATE 10 MILLIGRAM(S): 2.5 TABLET ORAL at 05:08

## 2017-03-08 RX ADMIN — Medication 0.5 MILLIGRAM(S): at 05:08

## 2017-03-08 RX ADMIN — RIVAROXABAN 15 MILLIGRAM(S): KIT at 10:55

## 2017-03-08 RX ADMIN — Medication 1 SPRAY(S): at 05:08

## 2017-03-08 RX ADMIN — GABAPENTIN 600 MILLIGRAM(S): 400 CAPSULE ORAL at 13:33

## 2017-03-08 RX ADMIN — PIPERACILLIN AND TAZOBACTAM 25 GRAM(S): 4; .5 INJECTION, POWDER, LYOPHILIZED, FOR SOLUTION INTRAVENOUS at 05:08

## 2017-03-08 RX ADMIN — Medication 3 MILLILITER(S): at 17:36

## 2017-03-08 RX ADMIN — PANTOPRAZOLE SODIUM 40 MILLIGRAM(S): 20 TABLET, DELAYED RELEASE ORAL at 05:08

## 2017-03-08 RX ADMIN — Medication 1 SPRAY(S): at 17:47

## 2017-03-08 RX ADMIN — GABAPENTIN 600 MILLIGRAM(S): 400 CAPSULE ORAL at 05:09

## 2017-03-08 RX ADMIN — Medication 0.5 MILLIGRAM(S): at 13:33

## 2017-03-08 NOTE — PROGRESS NOTE ADULT - SUBJECTIVE AND OBJECTIVE BOX
INTERVAL HISTORY:    Right Lung Cancer.        MEDICATIONS  (STANDING):  influenza   Vaccine 0.5milliLiter(s) IntraMuscular once  gabapentin 600milliGRAM(s) Oral three times a day  LORazepam     Tablet 0.5milliGRAM(s) Oral three times a day  amLODIPine   Tablet 10milliGRAM(s) Oral daily  levothyroxine 200MICROGram(s) Oral daily  rivaroxaban 15milliGRAM(s) Oral <User Schedule>  fluticasone propionate 50 MICROgram(s)/spray Nasal Spray 1Spray(s) Both Nostrils two times a day  pantoprazole    Tablet 40milliGRAM(s) Oral before breakfast  piperacillin/tazobactam IVPB. 3.375Gram(s) IV Intermittent every 8 hours  potassium chloride    Tablet ER 20milliEquivalent(s) Oral every 2 hours    MEDICATIONS  (PRN):  oxyCODONE  5 mG/acetaminophen 325 mG 1Tablet(s) Oral every 6 hours PRN Severe Pain (7 - 10)  acetaminophen   Tablet 650milliGRAM(s) Oral every 6 hours PRN For Temp greater than 38 C (100.4 F)  guaiFENesin    Syrup 200milliGRAM(s) Oral every 6 hours PRN Cough  benzonatate 100milliGRAM(s) Oral three times a day PRN Cough      Vital Signs Last 24 Hrs  T(C): 37.8, Max: 37.8 (03-08 @ 10:21)  T(F): 100.1, Max: 100.1 (03-08 @ 10:21)  HR: 92 (80 - 96)  BP: 109/67 (107/68 - 127/73)  BP(mean): --  RR: 16 (16 - 16)  SpO2: 95% (95% - 100%)    PHYSICAL EXAM:    GENERAL: NAD,   HEAD:  Atraumatic, Normocephalic  EYES: EOMI, PERRLA, conjunctiva and sclera clear    NECK: Supple, No JVD, Normal thyroid  NERVOUS SYSTEM:    CHEST/LUNG:  rales, rhonchi,   HEART: Regular rate and rhythm;   ABDOMEN: Soft, Nontender.  EXTREMITIES:   edema:- left leg.  LYMPH: No lymphadenopathy noted        LABS:                        10.5   10.4  )-----------( 327      ( 08 Mar 2017 06:31 )             30.6     08 Mar 2017 06:31    140    |  104    |  13     ----------------------------<  83     3.4     |  28     |  0.43     Ca    7.2        08 Mar 2017 06:31              RADIOLOGY & ADDITIONAL STUDIES:    PATHOLOGY:

## 2017-03-08 NOTE — PROGRESS NOTE ADULT - SUBJECTIVE AND OBJECTIVE BOX
CHIEF COMPLAINT/INTERVAL HISTORY:    Patient is a 61y old  Female who presents with a chief complaint of Leg swelling (01 Mar 2017 00:23)      HPI:  61 year old woman with PMH HTN, history of stomach and thyroid cancer (neither active as per family) spinal lesion (unclear etiology, followed q 1 month with MRI as per family) and paraplegia presents with complaint of b/l leg swelling for unknown amount of time.  Family states that they noticed the patient's feet were swollen for the last few weeks and advised her to see her doctor but the patient had trouble leaving her home due to her paralysis.  She also complains of cough with pleuritic chest pain x 1 week.  She denies fever, chills, wheezing, SOB.          SUBJECTIVE & OBJECTIVE: Pt seen and examined at bedside. c/o cough.  Denies chest pain/SOB, nausea/vomiting/diarrhea, No dizziness, HA or blurry vision, all other ROS negative.       Vital Signs Last 24 Hrs  T(C): 37.3, Max: 37.8 (03-08 @ 10:21)  T(F): 99.2, Max: 100.1 (03-08 @ 10:21)  HR: 90 (90 - 96)  BP: 98/55 (98/55 - 127/73)  BP(mean): --  ABP: --  ABP(mean): --  RR: 17 (16 - 17)  SpO2: 94% (94% - 100%)        MEDICATIONS  (STANDING):  influenza   Vaccine 0.5milliLiter(s) IntraMuscular once  gabapentin 600milliGRAM(s) Oral three times a day  LORazepam     Tablet 0.5milliGRAM(s) Oral three times a day  amLODIPine   Tablet 10milliGRAM(s) Oral daily  levothyroxine 200MICROGram(s) Oral daily  rivaroxaban 15milliGRAM(s) Oral <User Schedule>  fluticasone propionate 50 MICROgram(s)/spray Nasal Spray 1Spray(s) Both Nostrils two times a day  pantoprazole    Tablet 40milliGRAM(s) Oral before breakfast  piperacillin/tazobactam IVPB. 3.375Gram(s) IV Intermittent every 8 hours  potassium chloride    Tablet ER 20milliEquivalent(s) Oral every 2 hours  ALBUTerol/ipratropium for Nebulization 3milliLiter(s) Nebulizer every 6 hours    MEDICATIONS  (PRN):  oxyCODONE  5 mG/acetaminophen 325 mG 1Tablet(s) Oral every 6 hours PRN Severe Pain (7 - 10)  acetaminophen   Tablet 650milliGRAM(s) Oral every 6 hours PRN For Temp greater than 38 C (100.4 F)  guaiFENesin    Syrup 200milliGRAM(s) Oral every 6 hours PRN Cough  benzonatate 100milliGRAM(s) Oral three times a day PRN Cough        PHYSICAL EXAM:    GENERAL: NAD, well develoepd, malnourished  HEAD:  Atraumatic, Normocephalic  EYES: EOMI, PERRLA, conjunctiva and sclera clear, + pallor  ENMT: Moist mucous membranes  NECK: Supple, No JVD  NERVOUS SYSTEM:  Alert & Oriented X3,cranial nerves intact, speech clear, b/l LE weakness is chronic for almost 12 months, worse over last 6 months.  CHEST/LUNG: decreased BS right lung.  HEART: Regular rate and rhythm;  ABDOMEN: Soft, Nontender, Nondistended; Bowel sounds present  EXTREMITIES:  LLE edema+    LABS:                        10.5   10.4  )-----------( 327      ( 08 Mar 2017 06:31 )             30.6     08 Mar 2017 06:31    140    |  104    |  13     ----------------------------<  83     3.4     |  28     |  0.43     Ca    7.2        08 Mar 2017 06:31        CXR, 3/6  IMPRESSION: Persistent right lung mass.

## 2017-03-08 NOTE — PROGRESS NOTE ADULT - PROBLEM SELECTOR PLAN 1
-Newly Dx lung mass, NSCLC  -S/P IVC filter and Bronchoscopy + biopsy on 3/3, pathology c/w NSCLC  -Pulmonary and CT surgeon following, inoperable lung mass, PET scan , and then chem/RT as per hem/oncology  -palliative care eval appreciated, family wants to pursue all intervention and aggressive treatment.  -DC plan to [facility] once fever resolved/off Abx

## 2017-03-08 NOTE — DISCHARGE NOTE ADULT - HOSPITAL COURSE
61 year old woman with PMH HTN, history of stomach CA s/p subtotal gastrectomy 5 years ago, and thyroid cancer s/p surgery 17 years ago (neither active as per family) spinal lesion (unclear etiology, followed q 1 month with MRI as per family) and bilateral LE weakness with neuropathy, with limited mobility. Presented with complaint of  left leg swelling for unknown amount of time. Found to have extensive left leg DVT and right lung mass ,initially on heparin gtt, now switched to PO xarelto,  S/P IVC filter and Bronchoscopy + washing + biopsy on 03/03/17. Surgical pathology c/w NSCLC poorly differentiated, as per hem/onc and CT surg. lung mass in- operable and PET scan as outpatient, pt. also s/p 5 days of  IV zosyn for ?possible pneumonia for gram negative coverage As per ID.+ fever spikes likely due to malignancy vs acute dvt.  Endo was also consulted for h/o thyroid ca, hypothyroidism and adrenal nodule on CT. hormone  w/u sent, and pending labs to be followed as outpatient in endo office.  Pt. was seen by endo, ID, pulmonary, CT surgery, Oncology and neurology and palliative care team.  Family wants to pursue all work up and treatment for malignancy, as per Oncology pt. to get PET scan to evaluate for metastasis and chemo and RT as outpatient.  Pt. completed  Abx course, as per CT surgery, pulmonary, ID and hem/Oncology pt. Ok to be discharged.

## 2017-03-08 NOTE — PROGRESS NOTE ADULT - ATTENDING COMMENTS
61 year old woman with PMH HTN, history of stomach and thyroid cancer (neither active as per family) spinal lesion (unclear etiology, followed q 1 month with MRI as per family) and paraplegia presents with complaint of b/l leg swelling for unknown amount of time.  Family states that they noticed the patient's feet were swollen for the last few weeks and advised her to see her doctor but the patient had trouble leaving her home due to her paralysis.  She also complains of cough with pleuritic chest pain x 1 week.  She denies fever, chills, wheezing, SOB.LE US positive for b/l DVT.  met with patient and called daughter/HCP and discussed GOC & Advanced care planning. Palliative care info/counseling provided. Advanced Directives addressed. Spoke to daughter in detail on advanced care planning. As per daughter they would like to send patient for rehab and the for PET SCAN and continue aggressive treatment, including full code. Not ready for Goals of care and advanced care planning at this time.
Prognosis guarded.
d/w ID and Oncology, Pt Ok to be DC to rehab, d/w CW/SW bed available at [facility], d/w daughter she is in agreement with DC to [facility] today.

## 2017-03-08 NOTE — DISCHARGE NOTE ADULT - MEDICATION SUMMARY - MEDICATIONS TO TAKE
I will START or STAY ON the medications listed below when I get home from the hospital:    acetaminophen 325 mg oral tablet  -- 2 tab(s) by mouth every 6 hours, As needed, For Temp greater than 38 C (100.4 F)  -- Indication: For Fever, unspecified fever cause    acetaminophen-oxyCODONE 325 mg-5 mg oral tablet  -- 1 tab(s) by mouth every 6 hours, As needed, Severe Pain (7 - 10)  -- Indication: For Pain    Xarelto 15 mg oral tablet  -- 1 tab(s) by mouth 2 times a day   15mg PO BID for total 21 days (until 3/25) then from 3/26, start 20 mg PO q daily with food  -- Indication: For ACUTE DEEP VEIN THROMBOSIS LOWER EXTREMITY    LORazepam 0.5 mg oral tablet  -- 1 tab(s) by mouth 3 times a day  -- Indication: For Anxiety    gabapentin 300 mg oral tablet  --  by mouth 3 times a day  -- Indication: For Pain    benzonatate 100 mg oral capsule  -- 1 cap(s) by mouth 3 times a day, As needed, Cough  -- Indication: For cough    albuterol-ipratropium 2.5 mg-0.5 mg/3 mL inhalation solution  -- 3 milliliter(s) inhaled every 6 hours   -- Indication: For cough    amLODIPine 10 mg oral tablet  -- 1 tab(s) by mouth once a day, hold if SBP less than 125  -- Indication: For HTN (hypertension)    guaiFENesin 100 mg/5 mL oral liquid  -- 10 milliliter(s) by mouth every 6 hours, As needed, Cough  -- Indication: For cough    fluticasone 50 mcg/inh nasal spray  -- 1 spray(s) into nose 2 times a day  -- Indication: For Post nasal drip    pantoprazole 40 mg oral delayed release tablet  -- 1 tab(s) by mouth once a day (before a meal)  -- Indication: For Gastritis    levothyroxine 200 mcg (0.2 mg) oral tablet  -- 1 tab(s) by mouth once a day  -- Indication: For Acquired hypothyroidism

## 2017-03-08 NOTE — PROGRESS NOTE ADULT - PROBLEM SELECTOR PLAN 8
fever due to DVT vs ?possible pna  on IV zosyn for gram neg coverage,  monitor temp curve, Leukocytosis resolved.  will f/u ID

## 2017-03-08 NOTE — PROGRESS NOTE ADULT - ASSESSMENT
61 year old woman with PMH HTN, history of stomach CA s/p subtotal gastrectomy 5 years ago, and thyroid cancer s/p surgery 17 years ago (neither active as per family)Cervical spinal lesion (unclear etiology, followed q 1 month with MRI as per family) and bilateral LE weakness with neuropathy, with limited mobility. Presented with complaint of  leg swelling for unknown amount of time. Found to have extensive left leg DVT and right lung mass. S/P IVC filter and Bronchoscopy + washing + biopsy on 03/03/17. Surgical pathology c/w NSCLC poorly differentiated, as per hem/onc and CT surg. not operable and PT scan as outpateint, pt. also on IV zosyn for possible pneumonia for gram negative coverage.+ fever spikes due to malignancy vs dvt vs ?pna.

## 2017-03-08 NOTE — DISCHARGE NOTE ADULT - SECONDARY DIAGNOSIS.
Acute deep vein thrombosis (DVT) of left lower extremity, unspecified vein Fever, unspecified fever cause Adrenal mass Hypothyroidism (acquired) Weakness Decubitus ulcer of sacral region, stage 1

## 2017-03-08 NOTE — PROGRESS NOTE ADULT - SUBJECTIVE AND OBJECTIVE BOX
pt comfortabl vss  no new sx   still acute intervention  later GOC and adv dir to be discussed   no news in recom

## 2017-03-08 NOTE — DISCHARGE NOTE ADULT - MEDICATION SUMMARY - MEDICATIONS TO STOP TAKING
I will STOP taking the medications listed below when I get home from the hospital:    indapamide 1.25 mg oral tablet  -- 1 tab(s) by mouth once a day (in the morning)    Carafate 1 g/10 mL oral suspension  -- 10 milliliter(s) by mouth 3 times

## 2017-03-08 NOTE — DISCHARGE NOTE ADULT - PATIENT PORTAL LINK FT
“You can access the FollowHealth Patient Portal, offered by [facility], by registering with the following website: [email or web address]”

## 2017-03-08 NOTE — PROGRESS NOTE ADULT - PROBLEM SELECTOR PROBLEM 2
Malignant neoplasm of middle lobe of right lung
Acute deep vein thrombosis (DVT) of left lower extremity, unspecified vein
Adrenal mass
Malignant neoplasm of middle lobe of right lung
Acute deep vein thrombosis (DVT) of left lower extremity, unspecified vein
Lung mass
Stomach cancer
Acute deep vein thrombosis (DVT) of left lower extremity, unspecified vein
Acute deep vein thrombosis (DVT) of left lower extremity, unspecified vein
Adrenal mass

## 2017-03-08 NOTE — DISCHARGE NOTE ADULT - CARE PROVIDER_API CALL
Maria Luz Barnett), Internal Medicine  [address]  [place] [address]  Phone: [phone]  Fax: [phone]    Barron Gray), Neurology  [address]  [place] [address]  Phone: [phone]  Fax: [phone]    Lawson Ceja), Medical Oncology  [address]  [place] [address]  Phone: [phone]  Fax: [phone]

## 2017-03-08 NOTE — DISCHARGE NOTE ADULT - PLAN OF CARE
complete recovery Follow with Oncology Dr. eCja x 1 week , get PET scan by Oncology to evaluate for metastasis and then chemo and RT as per oncology recommendations Continue xarelto 15mg PO BID for total 21 days (until 3/25) and then from 3/26 change dose to xarelto 20 mg Q daily. Fever is likely due to malignancy and acute DVT, fever resolving, take tylenol as needed for fever.  you completed 5 days of broad spectrum IV antibiotics course. follow with Endocrinology Dr. Barnett and follow metanephrine levels (pending lab results) by her office. continue synthroid  follow up with endocrinologist Dr. Barnett stage 1 sacral Pressure U, frequent positioning to release pressure, keep area clean and dry Chronic b/l LE weakness due to some spine problem/lesion as per family   MRI L spine c/w DJD of spine  PT/rehab

## 2017-03-08 NOTE — PROGRESS NOTE ADULT - PROBLEM SELECTOR PROBLEM 1
Fever, unspecified fever cause
Fever, unspecified fever cause
Lung mass
Lung mass
Malignant neoplasm of middle lobe of right lung
Thyroid cancer
Hilar mass
Lung mass
Malignant neoplasm of middle lobe of right lung
Abnormal chest x-ray
Hilar mass
Lung mass
Thyroid cancer

## 2017-03-08 NOTE — PROGRESS NOTE ADULT - PROBLEM SELECTOR PLAN 4
-follow up Endo
-h/o thyroid ca, s/p surgery x 17 years back on RT and chemo.  -Acquired hypothyroidism, Endo following,   -s/p partial thyroidectomy, c/w increased dose of levothyroxine 200mcg daily  -TG ab +   -neck sono -> [id number] left thyroid bed lesion, when compared to 3/26/15 sono from doyrs described as right hemithyroidectomy with left lobe measuring 3.8x1.3x1.1cm,  "lesion" likely left lobe remnant  as per endo to be followed as outpatient.    Problem/Plan - 2:  ·  Problem: Adrenal mass.  Plan:
Endo following  cortisol and aldosterone normal  f/u metanephrine levels.
IVC filter.
Xeralto
endo following  f/u hormonal w/u
endo following  f/u hormonal w/u

## 2017-03-08 NOTE — PROGRESS NOTE ADULT - PROBLEM SELECTOR PLAN 5
with significant left leg swelling with pain  s/p IVC filter
-h/o ca stomach s/p chemo  -tolerating PO well
-h/o thyroid ca, s/p surgery x 17 years back on RT and chemo.  -Acquired hypothyroidism, Endo following,   -s/p partial thyroidectomy, c/w increased dose of levothyroxine 200mcg daily  -TG ab +   -neck sono -> [id number] left thyroid bed lesion, when compared to 3/26/15 sono from [facility] described as right hemithyroidectomy with left lobe measuring 3.8x1.3x1.1cm,  "lesion" likely left lobe remnant  as per endo to be followed as outpatient.    Problem/Plan - 2:  ·  Problem: Adrenal mass.  Plan:
-h/o thyroid ca, s/p surgery x 17 years back on RT and chemo.  -Acquired hypothyroidism, Endo following, continue with synthroid.
h/o thyroid ca, s/p surgery x 17 years back on RT and chemo.  Acquired hypothyroidism, endo following, continue with increased dose of synthroid.
h/o thyroid ca, s/p surgery x 17 years back on RT and chemo.  Acquired hypothyroidism, endo following, continue with increased dose of synthroid.
s/p IVC filter

## 2017-03-08 NOTE — PROGRESS NOTE ADULT - PROBLEM SELECTOR PLAN 9
fever due to DVT vs ?possible pna  on IV zosyn for gram neg coverage  d/w ID, will c/w 5 days of Abx as fever resolving
fever due to DVT vs ?possible pna  on IV zosyn for gram neg coverage  d/w ID, will c/w 5 days of Abx as fever resolving
supplement
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  -Neuro following up  -cont on PT/OT

## 2017-03-08 NOTE — PROGRESS NOTE ADULT - PROBLEM SELECTOR PLAN 7
-on Norvasc , BP borderline lwo due to ativan/percocet  -monitor vitals, will add holding parameters

## 2017-03-08 NOTE — DISCHARGE NOTE ADULT - ADDITIONAL INSTRUCTIONS
Follow up with Oncology Dr. Ceja x 1 week  Get PET scan by Oncology as outpatient.  Follow up with PCP at [facility] rehab in 1-2 days  Follow with Neurology Dr. Gray x 1 week  Follow with Endocrinology Dr. Barnett x 1 week  Follow BMP in 2 to 3 days for potassium level.

## 2017-03-08 NOTE — PROGRESS NOTE ADULT - PROBLEM SELECTOR PROBLEM 9
Fever, unspecified fever cause
Fever, unspecified fever cause
Hypokalemia due to inadequate potassium intake
Generalized weakness

## 2017-03-08 NOTE — PROGRESS NOTE ADULT - SUBJECTIVE AND OBJECTIVE BOX
Pt admitted with massive swelling of her lfet leg due to acute DVT in the EMERGENCY DEPARRMENT      Still has swelling of the left leg on Zerlta  Subjective:    C/O cough no hemoptysis    Left leg is still swollen      Vital Signs:  Vital Signs Last 24 Hrs  T(C): 37.1, Max: 37.7 (03-08 @ 00:15)  T(F): 98.8, Max: 99.8 (03-08 @ 00:15)  HR: 92 (80 - 96)  BP: 109/67 (107/68 - 127/73)  RR: 16 (16 - 16)  SpO2: 95% (95% - 100%)  Wt(kg): -- on (O2)    Telemetry/Alarms:    Medications  influenza   Vaccine 0.5milliLiter(s) IntraMuscular once  oxyCODONE  5 mG/acetaminophen 325 mG 1Tablet(s) Oral every 6 hours PRN  gabapentin 600milliGRAM(s) Oral three times a day  LORazepam     Tablet 0.5milliGRAM(s) Oral three times a day  amLODIPine   Tablet 10milliGRAM(s) Oral daily  levothyroxine 200MICROGram(s) Oral daily  acetaminophen   Tablet 650milliGRAM(s) Oral every 6 hours PRN  guaiFENesin    Syrup 200milliGRAM(s) Oral every 6 hours PRN  benzonatate 100milliGRAM(s) Oral three times a day PRN  rivaroxaban 15milliGRAM(s) Oral <User Schedule>  fluticasone propionate 50 MICROgram(s)/spray Nasal Spray 1Spray(s) Both Nostrils two times a day  pantoprazole    Tablet 40milliGRAM(s) Oral before breakfast  piperacillin/tazobactam IVPB. 3.375Gram(s) IV Intermittent every 8 hours      Physical Exam  General: WN/WD NAD  Neurology: A&Ox3, nonfocal, HERNANDEZ x 4  Respiratory: CTA B/L  CV: RRR, S1S2, no murmurs, rubs or gallops  Abdominal: Soft, NT, ND +BS, Last BM  Extremities: Left leg is massively swollen but warm  Incisions: MSI Healing Well, Sternum Stable  Tubes/Wires:      I & Os for current day (as of 03-08 @ 07:40)  =============================================  IN:    Oral Fluid: 240 ml    Total IN: 240 ml  ---------------------------------------------  OUT:    Voided: 200 ml    Total OUT: 200 ml  ---------------------------------------------  Total NET: 40 ml      Labs  08 Mar 2017 06:31    140    |  104    |  13     ----------------------------<  83     3.4     |  28     |  0.43     Ca    7.2        08 Mar 2017 06:31                            10.5   10.4  )-----------( 327      ( 08 Mar 2017 06:31 )             30.6       Today's CXR:    PAST MEDICAL & SURGICAL HISTORY:  GERD (gastroesophageal reflux disease)  Thyroid cancer  Stomach cancer  HTN (hypertension)  No significant past surgical history      Assessment  61y Female admitted with complaints of Patient is a 61y old  Female who presents with a chief complaint of Leg swelling (01 Mar 2017 00:23)    Acute DVT  Lung Cancer  H/O Stomach Cancer  Hilar LN  Rt lung mass  Postoperative course/issues:    PLAN  Oncology: Will need PET/CT, Chemo and Radiation  Vasc: IVC filter in place  Heme: Stable H/H. On blood thinners Zeralto  Pulm: Encourage coughing, deep breathing and use of incentive spirometry. Wean off supplemental oxygen as able. Daily CXR.   Cardio: Monitor telemetry/alarms  GI: Tolerating diet. Continue stool softeners.  Renal: Daily BMP, supplement electrolytes as needed  ID: Off antibiotics. Stable.  Therapy: OOB/ambulate

## 2017-03-10 DIAGNOSIS — E89.0 POSTPROCEDURAL HYPOTHYROIDISM: ICD-10-CM

## 2017-03-10 DIAGNOSIS — G13.0 PARANEOPLASTIC NEUROMYOPATHY AND NEUROPATHY: ICD-10-CM

## 2017-03-10 DIAGNOSIS — Z90.3 ACQUIRED ABSENCE OF STOMACH [PART OF]: ICD-10-CM

## 2017-03-10 DIAGNOSIS — Z74.01 BED CONFINEMENT STATUS: ICD-10-CM

## 2017-03-10 DIAGNOSIS — G32.0 SUBACUTE COMBINED DEGENERATION OF SPINAL CORD IN DISEASES CLASSIFIED ELSEWHERE: ICD-10-CM

## 2017-03-10 DIAGNOSIS — Z85.850 PERSONAL HISTORY OF MALIGNANT NEOPLASM OF THYROID: ICD-10-CM

## 2017-03-10 DIAGNOSIS — L89.151 PRESSURE ULCER OF SACRAL REGION, STAGE 1: ICD-10-CM

## 2017-03-10 DIAGNOSIS — J43.2 CENTRILOBULAR EMPHYSEMA: ICD-10-CM

## 2017-03-10 DIAGNOSIS — R53.1 WEAKNESS: ICD-10-CM

## 2017-03-10 DIAGNOSIS — I82.412 ACUTE EMBOLISM AND THROMBOSIS OF LEFT FEMORAL VEIN: ICD-10-CM

## 2017-03-10 DIAGNOSIS — E27.9 DISORDER OF ADRENAL GLAND, UNSPECIFIED: ICD-10-CM

## 2017-03-10 DIAGNOSIS — F41.9 ANXIETY DISORDER, UNSPECIFIED: ICD-10-CM

## 2017-03-10 DIAGNOSIS — E04.1 NONTOXIC SINGLE THYROID NODULE: ICD-10-CM

## 2017-03-10 DIAGNOSIS — I82.4Y2 ACUTE EMBOLISM AND THROMBOSIS OF UNSPECIFIED DEEP VEINS OF LEFT PROXIMAL LOWER EXTREMITY: ICD-10-CM

## 2017-03-10 DIAGNOSIS — R50.9 FEVER, UNSPECIFIED: ICD-10-CM

## 2017-03-10 DIAGNOSIS — K76.0 FATTY (CHANGE OF) LIVER, NOT ELSEWHERE CLASSIFIED: ICD-10-CM

## 2017-03-10 DIAGNOSIS — R91.8 OTHER NONSPECIFIC ABNORMAL FINDING OF LUNG FIELD: ICD-10-CM

## 2017-03-10 DIAGNOSIS — K21.9 GASTRO-ESOPHAGEAL REFLUX DISEASE WITHOUT ESOPHAGITIS: ICD-10-CM

## 2017-03-10 DIAGNOSIS — R93.8 ABNORMAL FINDINGS ON DIAGNOSTIC IMAGING OF OTHER SPECIFIED BODY STRUCTURES: ICD-10-CM

## 2017-03-10 DIAGNOSIS — I82.4Z2 ACUTE EMBOLISM AND THROMBOSIS OF UNSPECIFIED DEEP VEINS OF LEFT DISTAL LOWER EXTREMITY: ICD-10-CM

## 2017-03-10 DIAGNOSIS — I82.432 ACUTE EMBOLISM AND THROMBOSIS OF LEFT POPLITEAL VEIN: ICD-10-CM

## 2017-03-10 DIAGNOSIS — R00.0 TACHYCARDIA, UNSPECIFIED: ICD-10-CM

## 2017-03-10 DIAGNOSIS — Z85.028 PERSONAL HISTORY OF OTHER MALIGNANT NEOPLASM OF STOMACH: ICD-10-CM

## 2017-03-10 DIAGNOSIS — Z91.018 ALLERGY TO OTHER FOODS: ICD-10-CM

## 2017-03-10 DIAGNOSIS — G82.20 PARAPLEGIA, UNSPECIFIED: ICD-10-CM

## 2017-03-10 DIAGNOSIS — D72.829 ELEVATED WHITE BLOOD CELL COUNT, UNSPECIFIED: ICD-10-CM

## 2017-03-10 DIAGNOSIS — Z51.5 ENCOUNTER FOR PALLIATIVE CARE: ICD-10-CM

## 2017-03-10 DIAGNOSIS — E87.6 HYPOKALEMIA: ICD-10-CM

## 2017-03-10 DIAGNOSIS — I10 ESSENTIAL (PRIMARY) HYPERTENSION: ICD-10-CM

## 2017-03-10 DIAGNOSIS — J90 PLEURAL EFFUSION, NOT ELSEWHERE CLASSIFIED: ICD-10-CM

## 2017-03-10 DIAGNOSIS — C34.2 MALIGNANT NEOPLASM OF MIDDLE LOBE, BRONCHUS OR LUNG: ICD-10-CM

## 2017-03-10 DIAGNOSIS — E83.42 HYPOMAGNESEMIA: ICD-10-CM

## 2017-03-10 DIAGNOSIS — Z91.81 HISTORY OF FALLING: ICD-10-CM

## 2017-03-10 DIAGNOSIS — G62.9 POLYNEUROPATHY, UNSPECIFIED: ICD-10-CM

## 2017-03-10 DIAGNOSIS — J15.6 PNEUMONIA DUE TO OTHER GRAM-NEGATIVE BACTERIA: ICD-10-CM

## 2017-03-10 DIAGNOSIS — E83.39 OTHER DISORDERS OF PHOSPHORUS METABOLISM: ICD-10-CM

## 2017-03-10 DIAGNOSIS — I82.442 ACUTE EMBOLISM AND THROMBOSIS OF LEFT TIBIAL VEIN: ICD-10-CM

## 2017-03-10 DIAGNOSIS — E61.0 COPPER DEFICIENCY: ICD-10-CM

## 2017-03-10 DIAGNOSIS — F17.210 NICOTINE DEPENDENCE, CIGARETTES, UNCOMPLICATED: ICD-10-CM

## 2017-03-10 LAB
CULTURE RESULTS: SIGNIFICANT CHANGE UP
CULTURE RESULTS: SIGNIFICANT CHANGE UP
METANEPHRINE, PL: SIGNIFICANT CHANGE UP PG/ML
NORMETANEPHRINE, PL: SIGNIFICANT CHANGE UP PG/ML
SPECIMEN SOURCE: SIGNIFICANT CHANGE UP
SPECIMEN SOURCE: SIGNIFICANT CHANGE UP

## 2018-12-03 NOTE — PHYSICAL THERAPY INITIAL EVALUATION ADULT - PATIENT/FAMILY AGREES WITH PLAN
Patient Instructions by Abril Meza MD at 11/07/18 06:27 PM     Author:  Abril Meza MD Service:  (none) Author Type:  Physician     Filed:  11/07/18 06:27 PM Encounter Date:  11/7/2018 Status:  Signed     :  Abril Meza MD (Physician)            Medication start: Bupropion 75 mg twice daily (start with 1 tablet in the morning for 6 days, if tolerated then start taking 1 tablet twice daily.  Second dose around noon). Propranolol 10 mg twice daily as needed for anxiety  Risks, benefits, and side effects of medication discussed with the patient and the patient verbalized a clear understanding of the information discussed.   Also discussed with patient, not to consume alcohol with medication(s).  Encouraged patient to consider psychotherapy.  Call clinic as needed [phone].  For acute crisis or suicidal ideation call crisis line or go to emergency department.  Follow-up with labs ordered  CBC, CMP and TSH.  Counseled about good sleep hygiene.  Counseled about abstinence/curtail: alcohol, caffeine, or any other substance use  Return to clinic in 4 weeks      Revision History        User Key Date/Time User Provider Type Action    > [N/A] 11/07/18 06:27 PM Abril Meza MD Physician Sign            
yes
yes

## 2019-05-10 NOTE — PROGRESS NOTE ADULT - PROBLEM SELECTOR PROBLEM 7
Generalized weakness Consent (Ear)/Introductory Paragraph: The rationale for Mohs was explained to the patient and consent was obtained. The risks, benefits and alternatives to therapy were discussed in detail. Specifically, the risks of ear deformity, infection, scarring, bleeding, prolonged wound healing, incomplete removal, allergy to anesthesia, nerve injury and recurrence were addressed. Prior to the procedure, the treatment site was clearly identified and confirmed by the patient. All components of Universal Protocol/PAUSE Rule completed.

## 2019-09-30 NOTE — PATIENT PROFILE ADULT. - AS SC BRADEN SENSORY
(3) slightly limited Valtrex Pregnancy And Lactation Text: this medication is Pregnancy Category B and is considered safe during pregnancy. This medication is not directly found in breast milk but it's metabolite acyclovir is present.

## 2022-06-17 NOTE — ED ADULT NURSE NOTE - PMH
Received request via: Pharmacy    Was the patient seen in the last year in this department? Yes    Does the patient have an active prescription (recently filled or refills available) for medication(s) requested? No  
GERD (gastroesophageal reflux disease)    HTN (hypertension)    Stomach cancer    Thyroid cancer

## 2023-01-11 NOTE — PATIENT PROFILE ADULT. - HEALTHCARE QUESTIONS, PROFILE
none Tazorac Counseling:  Patient advised that medication is irritating and drying.  Patient may need to apply sparingly and wash off after an hour before eventually leaving it on overnight.  The patient verbalized understanding of the proper use and possible adverse effects of tazorac.  All of the patient's questions and concerns were addressed.

## 2023-01-26 NOTE — PROVIDER CONTACT NOTE (CRITICAL VALUE NOTIFICATION) - BACKGROUND
patient is admitted with dvt
Tracey Milan  OBSTETRICS AND GYNECOLOGY  [address] [place] [address]  Phone: [phone]  Fax: [phone]  Follow Up Time:

## 2024-09-02 NOTE — PRE-OP CHECKLIST - TO WHOM
[facility]    Medicine Progress Note - Hospitalist Service    Date of Admission:  8/30/2024    Assessment & Plan     75-year-old female with history of Lewy body dementia who lives with her  who is her primary caregiver.  She was brought to the ER because of increasing confusion.  Patient has been found lying down on the floor at different parts of the house incontinent of urine.  She has been recently treated with antibiotics for UTI.  The patient's dementia has been worsening over the last several years, and now likely requires a higher level of support.      Lewy body dementia  Acute confusional state  Dementia with behavioral abnormality  Probable hallucinations  Patient is unable to give any history and she looks quite confused, hallucinating.  CT scan of the chest did not reveal any pneumonia, she does not have any abdominal tenderness.   -I will continue Aricept 5 mg at bedtime -May have to increase the dose to 10 mg  -Patient takes olanzapine 2.5 mg by mouth 2 times daily, add additional prn available  - melatonin at night for sleeping  - consult for assistance with discharge planning     JANETTE  Baseline creatinine was around 0.97 which is now increased to 1.26, now improving    Acute/subacute moderate T11 compression fracture  Chronic severe L1 compression   Chronic L3 superior endplate compression fracture  Neurosurgery consulted, MRI with re-demonstration of above abnormalities. Ok to be out of bed, no excessive bending or lifting greater than 5-10 lbs, follow up will be arranged by neurosurgery.     Hypothyroidism  Continue levothyroxine 112 mcg daily        Diet: Regular Diet Adult    DVT Prophylaxis: Pneumatic Compression Devices  Borjas Catheter: Not present  Lines: None     Cardiac Monitoring: None  Code Status: No CPR- Do NOT Intubate      Clinically Significant Risk Factors                                # Financial/Environmental Concerns: none               Disposition  Plan     Expected Discharge Date: 09/02/2024      Destination: assisted living            Katarzyna LovettDO  Hospitalist Service  [employer]  Securely message with [employer] (more info)  Text page via [employer]/Directory   ______________________________________________________________________    Interval History   Resting comfortably, rouses to voice    Physical Exam   Vital Signs: Temp: 97.4  F (36.3  C) Temp src: Axillary BP: (!) 158/98 Pulse: 75   Resp: 18 SpO2: 97 % O2 Device: None (Room air)    Weight: 0 lbs 0 oz    General: resting comfortably  Respiratory: Normal work of breathing  Musculoskeletal: Moving all extremities   Neurologic: responds to voice    Medical Decision Making       35 MINUTES SPENT BY ME on the date of service doing chart review, history, exam, documentation & further activities per the note.        Data     I have personally reviewed the following data over the past 24 hrs:    N/A  \   N/A   / 196     N/A N/A N/A /  N/A   N/A N/A 0.85 \       Imaging results reviewed over the past 24 hrs:   Recent Results (from the past 24 hour(s))   MR Lumbar Spine w/o Contrast    Narrative    EXAM: MR LUMBAR SPINE WITHOUT CONTRAST  LOCATION: [facility]  DATE: 09/01/2024    INDICATION: Please include T11 for evaluation of fracture; Low back pain; Trauma and or suspected fracture; Insignificant trauma, rule out compression fracture; At risk for osteoporosis; No lumbar x-ray result available.  COMPARISON: Lumbar spine CT 01/10/2023. CT abdomen and pelvis 08/30/2024.  TECHNIQUE: Routine Lumbar Spine MRI without IV contrast.    FINDINGS:   Five lumbar-type vertebrae counting down from the presumed twelfth ribs. The tip of the conus medullaris is at L2. The cauda equina nerve roots and visualized lower thoracic spinal cord are within normal limits.    Recent (acute or subacute) T11 compression fracture with approximately 40-50% loss of vertebral body height and 2  mm retropulsion of fracture fragments contributing to mild spinal canal stenosis, unchanged since 08/30/2024. There is surrounding   perivertebral soft tissue edema T9-L1. The fracture demonstrates a fluid-filled intravertebral cleft, suggestive of a benign fracture. No associated focal destructive bone lesion or soft tissue mass.    Chronic severe L1 compression fracture is unchanged in alignment since 08/30/2024, though demonstrates progressive vertebral body collapse since 01/10/2023. There is 11 mm retropulsion of fracture fragments at L1, which contributes to mild to moderate   spinal canal stenosis.    Stable alignment of chronic L3 superior endplate compression fracture with superimposed Schmorl's node deformity resulting in approximately 25-35% loss of central vertebral body height, unchanged dating back to at least 01/10/2023.    Exaggeration of the normal lumbar lordotic curvature. No destructive bone lesion or infection. Moderate asymmetric left lower lumbar predominant dorsal paraspinous muscular atrophy. Partially visualized degenerative changes of the sacroiliac joints.   Colonic diverticulosis.    Findings on a level by level basis are as follows:    T10-T11: Normal disc height and signal. No disc herniation. Retropulsed T11 superior endplate fracture fragments. Facet arthrosis. Mild bilateral neural foraminal stenosis. Mild spinal canal stenosis.    T11-T12: Normal disc height signal. No disc herniation. Facet arthrosis. No significant spinal canal or neural foraminal stenosis.    T12-L1: Moderate disc height loss. Loss of disc signal. Disc bulge and retropulsed L1 superior endplate fracture fragments. Facet arthrosis. Mild bilateral neural foraminal stenosis. Mild to moderate spinal canal stenosis.    L1-L2: Mild disc height loss. Loss of disc signal. Disc bulge. No disc herniation. Facet arthrosis and ligamentum flavum thickening. Mild to moderate left and mild right neural foraminal stenosis. No  significant spinal canal stenosis.    L2-L3: Mild to moderate disc height loss. Loss of disc signal. Disc bulge. No disc herniation. Facet arthrosis and ligamentum flavum thickening. Mild bilateral neural foraminal stenosis. Mild to moderate spinal canal stenosis.    L3-L4: Mild disc height loss. Loss of disc signal. Disc bulge. No disc herniation. Facet arthrosis and ligamentum flavum thickening. Mild bilateral neural foraminal stenosis. Mild spinal canal stenosis.    L4-5: Mild disc height loss. Loss of disc signal. Disc bulge with annular fissuring. No focal disc herniation. Facet arthrosis and ligamentum flavum thickening. Mild to moderate bilateral neural foraminal stenosis. Mild to moderate spinal canal stenosis.    L5-S1: Normal disc height. Loss of disc signal. No disc herniation. Facet arthrosis. Mild bilateral neural foraminal stenosis. No significant spinal canal stenosis.      Impression    IMPRESSION:  1.  Recent (acute or subacute) moderate T11 compression fracture with slight retropulsion of fracture fragments contributing to mild spinal canal stenosis, unchanged since 08/30/2024.  2.  Chronic severe L1 compression fracture with retropulsed fracture fragments contributing to mild to moderate spinal canal stenosis, unchanged since 08/30/2024, though demonstrating progressive vertebral body collapse since 01/10/2023.  3.  Stable alignment of chronic mild to moderate L3 superior endplate compression fracture with superimposed Schmorl's node deformity dating back to at least 01/10/2023.  4.  Multilevel lumbar spondylosis.       XR Thoracic Spine 2 Views    Narrative    EXAM: XR THORACIC SPINE 2 VIEWS  LOCATION: [facility]  DATE: 9/1/2024    INDICATION: upright thoracic x ray for T11 fx for baseline  COMPARISON: 1/10/2023, 8/30/2024, 9/1/2024      Impression    IMPRESSION: Accentuation of the thoracic spine kyphosis. Redemonstrated compression deformities at T11, L1, and L3. No new  compression fractures are identified. Multilevel degenerative endplate changes with marginal osteophytes. Visualized portions of   the lungs are clear. Atherosclerotic calcification of the aortic arch.              van van/elli oliver 2D